# Patient Record
Sex: MALE | Race: BLACK OR AFRICAN AMERICAN | Employment: OTHER | URBAN - METROPOLITAN AREA
[De-identification: names, ages, dates, MRNs, and addresses within clinical notes are randomized per-mention and may not be internally consistent; named-entity substitution may affect disease eponyms.]

---

## 2020-01-20 ENCOUNTER — HOSPITAL ENCOUNTER (OUTPATIENT)
Dept: SURGERY | Age: 75
Discharge: HOME | End: 2020-01-22
Payer: MEDICARE

## 2020-01-20 PROCEDURE — 6370000000 HC RX 637 (ALT 250 FOR IP): Performed by: OPHTHALMOLOGY

## 2020-01-20 PROCEDURE — 66821 AFTER CATARACT LASER SURGERY: CPT

## 2020-01-20 RX ORDER — TROPICAMIDE 10 MG/ML
1 SOLUTION/ DROPS OPHTHALMIC SEE ADMIN INSTRUCTIONS
Status: COMPLETED | OUTPATIENT
Start: 2020-01-20 | End: 2020-01-20

## 2020-01-20 RX ORDER — PHENYLEPHRINE HYDROCHLORIDE 100 MG/ML
1 SOLUTION/ DROPS OPHTHALMIC SEE ADMIN INSTRUCTIONS
Status: COMPLETED | OUTPATIENT
Start: 2020-01-20 | End: 2020-01-20

## 2020-01-20 RX ADMIN — PHENYLEPHRINE HYDROCHLORIDE 1 DROP: 100 SOLUTION/ DROPS OPHTHALMIC at 09:40

## 2020-01-20 RX ADMIN — TROPICAMIDE 1 DROP: 10 SOLUTION/ DROPS OPHTHALMIC at 09:35

## 2020-01-20 RX ADMIN — APRACLONIDINE HYDROCHLORIDE 1 DROP: 10 SOLUTION/ DROPS OPHTHALMIC at 10:41

## 2020-01-20 RX ADMIN — TROPICAMIDE 1 DROP: 10 SOLUTION/ DROPS OPHTHALMIC at 09:40

## 2020-01-20 RX ADMIN — PHENYLEPHRINE HYDROCHLORIDE 1 DROP: 100 SOLUTION/ DROPS OPHTHALMIC at 09:35

## 2020-01-20 NOTE — PROGRESS NOTES
were reviewed with patient/significant other. A copy was given to the patient/significant other. The following additional patient specific information was reviewed with the patient/significant other:  []Procedure/physician specific instructions  []Other -         Nurse Javier Bullock R.N. Date/time 1/20/2020 1:38 PM         SAME DAY SERVICES:  [phone] (Monday-Friday 6 a.m - 6 p.m.)        If you smoke STOP. We care about your health!

## 2020-01-20 NOTE — OP NOTE
[facility]    OPERATIVE NOTE    Preoperative Diagnosis: Posterior Capsular Opacification, Right Eye    Postoperative Diagnosis: Posterior Capsular Opacification, Right Eye    Procedure: YAG posterior capsulotomy, Right Eye    Surgeon: Sonia Fish    Anesthesia: Topical    Complications: none      Indications for procedure: The patient is a 69y.o. year old who is status post cataract extraction with intraocular lens implantation. Visually significant posterior capsular opacification was noted on examination. Risks, benefits, and alternatives to surgery were discussed with the patient and the patient elected to proceed with YAG laser posterior capsulotomy of the right eye. Details of the procedure: The patient was brought to the laser room after dilation of the right eye was done with 10% neosynephrine and 1% mydriacyl every 5 minutes for a total of two sets. The patient was positioned at the YAG laser where 38 shots were administered at 1.8 millijoules of power. The capsule opened nicely. The patient tolerated the procedure well and was asked to remain for a post-operative pressure check.       Sonia Fish M.D.

## 2022-04-27 LAB
BACTERIA: ABNORMAL /HPF
BILIRUBIN URINE: NEGATIVE
BLOOD, URINE: ABNORMAL
CLARITY: CLEAR
COLOR: YELLOW
EPITHELIAL CELLS, UA: 2 /HPF (ref 0–5)
GLUCOSE URINE: 500 MG/DL
HYALINE CASTS: 16 /LPF (ref 0–8)
HYALINE CASTS: PRESENT
KETONES, URINE: NEGATIVE MG/DL
LEUKOCYTE ESTERASE, URINE: NEGATIVE
MICROSCOPIC EXAMINATION: YES
NITRITE, URINE: NEGATIVE
PH UA: 7 (ref 5–8)
PROTEIN UA: >=1000 MG/DL
RBC UA: 8 /HPF (ref 0–4)
SPECIFIC GRAVITY UA: 1.03 (ref 1–1.03)
URINE TYPE: ABNORMAL
UROBILINOGEN, URINE: 1 E.U./DL
WBC UA: 2 /HPF (ref 0–5)

## 2025-03-17 ENCOUNTER — APPOINTMENT (OUTPATIENT)
Dept: GENERAL RADIOLOGY | Age: 80
End: 2025-03-17
Payer: MEDICARE

## 2025-03-17 ENCOUNTER — APPOINTMENT (OUTPATIENT)
Dept: ULTRASOUND IMAGING | Age: 80
End: 2025-03-17
Payer: MEDICARE

## 2025-03-17 ENCOUNTER — HOSPITAL ENCOUNTER (INPATIENT)
Age: 80
LOS: 5 days | Discharge: HOME | End: 2025-03-22
Attending: STUDENT IN AN ORGANIZED HEALTH CARE EDUCATION/TRAINING PROGRAM | Admitting: STUDENT IN AN ORGANIZED HEALTH CARE EDUCATION/TRAINING PROGRAM
Payer: MEDICARE

## 2025-03-17 DIAGNOSIS — Z99.2 ESRD NEEDING DIALYSIS (HCC): Primary | ICD-10-CM

## 2025-03-17 DIAGNOSIS — J81.0 ACUTE PULMONARY EDEMA (HCC): ICD-10-CM

## 2025-03-17 DIAGNOSIS — R74.8 ELEVATED LIVER ENZYMES: ICD-10-CM

## 2025-03-17 DIAGNOSIS — E87.5 HYPERKALEMIA: ICD-10-CM

## 2025-03-17 DIAGNOSIS — J96.01 ACUTE RESPIRATORY FAILURE WITH HYPOXIA: ICD-10-CM

## 2025-03-17 DIAGNOSIS — N18.6 ESRD NEEDING DIALYSIS (HCC): Primary | ICD-10-CM

## 2025-03-17 LAB
ACANTHOCYTES BLD QL SMEAR: ABNORMAL
ALBUMIN SERPL-MCNC: 3.9 G/DL (ref 3.4–5)
ALBUMIN/GLOB SERPL: 1.3 {RATIO} (ref 1.1–2.2)
ALP SERPL-CCNC: 89 U/L (ref 40–129)
ALT SERPL-CCNC: 886 U/L (ref 10–40)
ANION GAP SERPL CALCULATED.3IONS-SCNC: 32 MMOL/L (ref 3–16)
ANISOCYTOSIS BLD QL SMEAR: ABNORMAL
AST SERPL-CCNC: 767 U/L (ref 15–37)
BASE EXCESS BLDV CALC-SCNC: -12.3 MMOL/L
BASOPHILS # BLD: 0 K/UL (ref 0–0.2)
BASOPHILS NFR BLD: 0 %
BILIRUB SERPL-MCNC: 1.3 MG/DL (ref 0–1)
BUN SERPL-MCNC: 133 MG/DL (ref 7–20)
CALCIUM SERPL-MCNC: 10.1 MG/DL (ref 8.3–10.6)
CHLORIDE SERPL-SCNC: 100 MMOL/L (ref 99–110)
CO2 BLDV-SCNC: 16 MMOL/L
CO2 SERPL-SCNC: 13 MMOL/L (ref 21–32)
COHGB MFR BLDV: 1.4 %
CREAT SERPL-MCNC: 13.7 MG/DL (ref 0.8–1.3)
DEPRECATED RDW RBC AUTO: 20.6 % (ref 12.4–15.4)
EKG ATRIAL RATE: 65 BPM
EKG ATRIAL RATE: 69 BPM
EKG DIAGNOSIS: NORMAL
EKG DIAGNOSIS: NORMAL
EKG P AXIS: 57 DEGREES
EKG P AXIS: 63 DEGREES
EKG P-R INTERVAL: 164 MS
EKG P-R INTERVAL: 184 MS
EKG Q-T INTERVAL: 424 MS
EKG Q-T INTERVAL: 428 MS
EKG QRS DURATION: 104 MS
EKG QRS DURATION: 94 MS
EKG QTC CALCULATION (BAZETT): 445 MS
EKG QTC CALCULATION (BAZETT): 454 MS
EKG R AXIS: 18 DEGREES
EKG R AXIS: 27 DEGREES
EKG T AXIS: 70 DEGREES
EKG T AXIS: 70 DEGREES
EKG VENTRICULAR RATE: 65 BPM
EKG VENTRICULAR RATE: 69 BPM
EOSINOPHIL # BLD: 0 K/UL (ref 0–0.6)
EOSINOPHIL NFR BLD: 0 %
FERRITIN SERPL IA-MCNC: 5230 NG/ML (ref 30–400)
FLUAV + FLUBV AG NOSE IA.RAPID: NOT DETECTED
FLUAV + FLUBV AG NOSE IA.RAPID: NOT DETECTED
GFR SERPLBLD CREATININE-BSD FMLA CKD-EPI: 3 ML/MIN/{1.73_M2}
GLUCOSE BLD-MCNC: 116 MG/DL (ref 70–99)
GLUCOSE BLD-MCNC: 272 MG/DL (ref 70–99)
GLUCOSE SERPL-MCNC: 198 MG/DL (ref 70–99)
HAV IGM SERPL QL IA: NORMAL
HBV CORE IGM SERPL QL IA: NORMAL
HBV SURFACE AB SERPL IA-ACNC: <3.5 MIU/ML
HBV SURFACE AG SERPL QL IA: NORMAL
HCO3 BLDV-SCNC: 15 MMOL/L (ref 23–29)
HCT VFR BLD AUTO: 36.3 % (ref 40.5–52.5)
HCV AB SERPL QL IA: NORMAL
HGB BLD-MCNC: 11.4 G/DL (ref 13.5–17.5)
HYPOCHROMIA BLD QL SMEAR: ABNORMAL
IRON SATN MFR SERPL: 91 % (ref 20–50)
IRON SERPL-MCNC: 155 UG/DL (ref 59–158)
LACTATE BLDV-SCNC: 2.3 MMOL/L (ref 0.4–2)
LACTATE BLDV-SCNC: 3.9 MMOL/L (ref 0.4–1.9)
LACTATE BLDV-SCNC: 5.6 MMOL/L (ref 0.4–1.9)
LYMPHOCYTES # BLD: 0.9 K/UL (ref 1–5.1)
LYMPHOCYTES NFR BLD: 7 %
MACROCYTES BLD QL SMEAR: ABNORMAL
MCH RBC QN AUTO: 30.1 PG (ref 26–34)
MCHC RBC AUTO-ENTMCNC: 31.5 G/DL (ref 31–36)
MCV RBC AUTO: 95.6 FL (ref 80–100)
METHGB MFR BLDV: 0.9 %
MONOCYTES # BLD: 1 K/UL (ref 0–1.3)
MONOCYTES NFR BLD: 8 %
NEUTROPHILS # BLD: 10.5 K/UL (ref 1.7–7.7)
NEUTROPHILS NFR BLD: 85 %
NRBC BLD-RTO: 4 /100 WBC
O2 THERAPY: ABNORMAL
OVALOCYTES BLD QL SMEAR: ABNORMAL
PCO2 BLDV: 36.5 MMHG (ref 40–50)
PERFORMED ON: ABNORMAL
PERFORMED ON: ABNORMAL
PH BLDV: 7.21 [PH] (ref 7.35–7.45)
PLATELET # BLD AUTO: 158 K/UL (ref 135–450)
PLATELET BLD QL SMEAR: ADEQUATE
PMV BLD AUTO: 10.1 FL (ref 5–10.5)
PO2 BLDV: 46 MMHG
POIKILOCYTOSIS BLD QL SMEAR: ABNORMAL
POLYCHROMASIA BLD QL SMEAR: ABNORMAL
POTASSIUM SERPL-SCNC: 6.7 MMOL/L (ref 3.5–5.1)
PROT SERPL-MCNC: 6.8 G/DL (ref 6.4–8.2)
RBC # BLD AUTO: 3.79 M/UL (ref 4.2–5.9)
SAO2 % BLDV: 63 %
SARS-COV-2 RDRP RESP QL NAA+PROBE: NOT DETECTED
SCHISTOCYTES BLD QL SMEAR: ABNORMAL
SLIDE REVIEW: ABNORMAL
SODIUM SERPL-SCNC: 145 MMOL/L (ref 136–145)
TARGETS BLD QL SMEAR: ABNORMAL
TIBC SERPL-MCNC: 171 UG/DL (ref 260–445)
WBC # BLD AUTO: 12.4 K/UL (ref 4–11)

## 2025-03-17 PROCEDURE — 82803 BLOOD GASES ANY COMBINATION: CPT

## 2025-03-17 PROCEDURE — 6370000000 HC RX 637 (ALT 250 FOR IP): Performed by: STUDENT IN AN ORGANIZED HEALTH CARE EDUCATION/TRAINING PROGRAM

## 2025-03-17 PROCEDURE — 99285 EMERGENCY DEPT VISIT HI MDM: CPT

## 2025-03-17 PROCEDURE — 87635 SARS-COV-2 COVID-19 AMP PRB: CPT

## 2025-03-17 PROCEDURE — 76705 ECHO EXAM OF ABDOMEN: CPT

## 2025-03-17 PROCEDURE — 2580000003 HC RX 258: Performed by: STUDENT IN AN ORGANIZED HEALTH CARE EDUCATION/TRAINING PROGRAM

## 2025-03-17 PROCEDURE — 82728 ASSAY OF FERRITIN: CPT

## 2025-03-17 PROCEDURE — 6360000002 HC RX W HCPCS: Performed by: STUDENT IN AN ORGANIZED HEALTH CARE EDUCATION/TRAINING PROGRAM

## 2025-03-17 PROCEDURE — 93005 ELECTROCARDIOGRAM TRACING: CPT | Performed by: STUDENT IN AN ORGANIZED HEALTH CARE EDUCATION/TRAINING PROGRAM

## 2025-03-17 PROCEDURE — 80074 ACUTE HEPATITIS PANEL: CPT

## 2025-03-17 PROCEDURE — 85025 COMPLETE CBC W/AUTO DIFF WBC: CPT

## 2025-03-17 PROCEDURE — 36556 INSERT NON-TUNNEL CV CATH: CPT | Performed by: NURSE PRACTITIONER

## 2025-03-17 PROCEDURE — 99222 1ST HOSP IP/OBS MODERATE 55: CPT | Performed by: SURGERY

## 2025-03-17 PROCEDURE — 83550 IRON BINDING TEST: CPT

## 2025-03-17 PROCEDURE — 87040 BLOOD CULTURE FOR BACTERIA: CPT

## 2025-03-17 PROCEDURE — 71045 X-RAY EXAM CHEST 1 VIEW: CPT

## 2025-03-17 PROCEDURE — 2000000000 HC ICU R&B

## 2025-03-17 PROCEDURE — 83540 ASSAY OF IRON: CPT

## 2025-03-17 PROCEDURE — 5A1D70Z PERFORMANCE OF URINARY FILTRATION, INTERMITTENT, LESS THAN 6 HOURS PER DAY: ICD-10-PCS | Performed by: FAMILY MEDICINE

## 2025-03-17 PROCEDURE — 90935 HEMODIALYSIS ONE EVALUATION: CPT

## 2025-03-17 PROCEDURE — 86706 HEP B SURFACE ANTIBODY: CPT

## 2025-03-17 PROCEDURE — 2500000003 HC RX 250 WO HCPCS: Performed by: STUDENT IN AN ORGANIZED HEALTH CARE EDUCATION/TRAINING PROGRAM

## 2025-03-17 PROCEDURE — 87502 INFLUENZA DNA AMP PROBE: CPT

## 2025-03-17 PROCEDURE — 36415 COLL VENOUS BLD VENIPUNCTURE: CPT

## 2025-03-17 PROCEDURE — 06HM33Z INSERTION OF INFUSION DEVICE INTO RIGHT FEMORAL VEIN, PERCUTANEOUS APPROACH: ICD-10-PCS | Performed by: FAMILY MEDICINE

## 2025-03-17 PROCEDURE — 93005 ELECTROCARDIOGRAM TRACING: CPT | Performed by: PHYSICIAN ASSISTANT

## 2025-03-17 PROCEDURE — 83605 ASSAY OF LACTIC ACID: CPT

## 2025-03-17 PROCEDURE — 93010 ELECTROCARDIOGRAM REPORT: CPT | Performed by: INTERNAL MEDICINE

## 2025-03-17 PROCEDURE — 80053 COMPREHEN METABOLIC PANEL: CPT

## 2025-03-17 RX ORDER — SODIUM CHLORIDE 0.9 % (FLUSH) 0.9 %
5-40 SYRINGE (ML) INJECTION PRN
Status: DISCONTINUED | OUTPATIENT
Start: 2025-03-17 | End: 2025-03-22 | Stop reason: HOSPADM

## 2025-03-17 RX ORDER — DIPYRIDAMOLE 75 MG
75 TABLET ORAL
Status: DISCONTINUED | OUTPATIENT
Start: 2025-03-17 | End: 2025-03-17

## 2025-03-17 RX ORDER — SPIRONOLACTONE 25 MG/1
50 TABLET ORAL DAILY
Status: DISCONTINUED | OUTPATIENT
Start: 2025-03-17 | End: 2025-03-22 | Stop reason: HOSPADM

## 2025-03-17 RX ORDER — SEVELAMER CARBONATE 800 MG/1
1 TABLET, FILM COATED ORAL
COMMUNITY

## 2025-03-17 RX ORDER — SODIUM CHLORIDE 0.9 % (FLUSH) 0.9 %
5-40 SYRINGE (ML) INJECTION EVERY 12 HOURS SCHEDULED
Status: DISCONTINUED | OUTPATIENT
Start: 2025-03-17 | End: 2025-03-22 | Stop reason: HOSPADM

## 2025-03-17 RX ORDER — ASPIRIN 81 MG/1
81 TABLET ORAL DAILY
COMMUNITY

## 2025-03-17 RX ORDER — DORZOLAMIDE HYDROCHLORIDE AND TIMOLOL MALEATE 20; 5 MG/ML; MG/ML
1 SOLUTION/ DROPS OPHTHALMIC 2 TIMES DAILY
Status: DISCONTINUED | OUTPATIENT
Start: 2025-03-17 | End: 2025-03-22 | Stop reason: HOSPADM

## 2025-03-17 RX ORDER — SEVELAMER CARBONATE 800 MG/1
800 TABLET, FILM COATED ORAL
Status: DISCONTINUED | OUTPATIENT
Start: 2025-03-17 | End: 2025-03-22 | Stop reason: HOSPADM

## 2025-03-17 RX ORDER — CALCIUM GLUCONATE 20 MG/ML
1000 INJECTION, SOLUTION INTRAVENOUS ONCE
Status: COMPLETED | OUTPATIENT
Start: 2025-03-17 | End: 2025-03-17

## 2025-03-17 RX ORDER — ONDANSETRON 2 MG/ML
4 INJECTION INTRAMUSCULAR; INTRAVENOUS EVERY 6 HOURS PRN
Status: DISCONTINUED | OUTPATIENT
Start: 2025-03-17 | End: 2025-03-22 | Stop reason: HOSPADM

## 2025-03-17 RX ORDER — SODIUM CHLORIDE 9 MG/ML
INJECTION, SOLUTION INTRAVENOUS PRN
Status: DISCONTINUED | OUTPATIENT
Start: 2025-03-17 | End: 2025-03-22 | Stop reason: HOSPADM

## 2025-03-17 RX ORDER — HYDRALAZINE HYDROCHLORIDE 20 MG/ML
10 INJECTION INTRAMUSCULAR; INTRAVENOUS EVERY 6 HOURS PRN
Status: DISCONTINUED | OUTPATIENT
Start: 2025-03-17 | End: 2025-03-22 | Stop reason: HOSPADM

## 2025-03-17 RX ORDER — SPIRONOLACTONE 50 MG/1
50 TABLET, FILM COATED ORAL DAILY
COMMUNITY

## 2025-03-17 RX ORDER — INDOMETHACIN 25 MG/1
50 CAPSULE ORAL ONCE
Status: COMPLETED | OUTPATIENT
Start: 2025-03-17 | End: 2025-03-17

## 2025-03-17 RX ORDER — GLUCAGON 1 MG/ML
1 KIT INJECTION PRN
Status: DISCONTINUED | OUTPATIENT
Start: 2025-03-17 | End: 2025-03-22 | Stop reason: HOSPADM

## 2025-03-17 RX ORDER — LOSARTAN POTASSIUM 50 MG/1
50 TABLET ORAL DAILY
Status: DISCONTINUED | OUTPATIENT
Start: 2025-03-17 | End: 2025-03-22 | Stop reason: HOSPADM

## 2025-03-17 RX ORDER — ASPIRIN 81 MG/1
81 TABLET, CHEWABLE ORAL DAILY
Status: DISCONTINUED | OUTPATIENT
Start: 2025-03-17 | End: 2025-03-22 | Stop reason: HOSPADM

## 2025-03-17 RX ORDER — EZETIMIBE AND SIMVASTATIN 10; 80 MG/1; MG/1
1 TABLET ORAL NIGHTLY
COMMUNITY

## 2025-03-17 RX ORDER — ACETAMINOPHEN 325 MG/1
650 TABLET ORAL EVERY 6 HOURS PRN
Status: DISCONTINUED | OUTPATIENT
Start: 2025-03-17 | End: 2025-03-22 | Stop reason: HOSPADM

## 2025-03-17 RX ORDER — NIFEDIPINE 60 MG/1
60 TABLET, EXTENDED RELEASE ORAL 2 TIMES DAILY
Status: DISCONTINUED | OUTPATIENT
Start: 2025-03-17 | End: 2025-03-17

## 2025-03-17 RX ORDER — POLYETHYLENE GLYCOL 3350 17 G/17G
17 POWDER, FOR SOLUTION ORAL DAILY PRN
Status: DISCONTINUED | OUTPATIENT
Start: 2025-03-17 | End: 2025-03-22 | Stop reason: HOSPADM

## 2025-03-17 RX ORDER — HEPARIN SODIUM 1000 [USP'U]/ML
2800 INJECTION, SOLUTION INTRAVENOUS; SUBCUTANEOUS PRN
Status: DISCONTINUED | OUTPATIENT
Start: 2025-03-17 | End: 2025-03-22 | Stop reason: HOSPADM

## 2025-03-17 RX ORDER — NEPHROCAP 1 MG
1 CAP ORAL DAILY
COMMUNITY

## 2025-03-17 RX ORDER — DEXTROSE MONOHYDRATE 100 MG/ML
INJECTION, SOLUTION INTRAVENOUS CONTINUOUS PRN
Status: DISCONTINUED | OUTPATIENT
Start: 2025-03-17 | End: 2025-03-22 | Stop reason: HOSPADM

## 2025-03-17 RX ORDER — NIFEDIPINE 60 MG/1
60 TABLET, EXTENDED RELEASE ORAL 2 TIMES DAILY
COMMUNITY
End: 2025-03-17

## 2025-03-17 RX ORDER — HEPARIN SODIUM 5000 [USP'U]/ML
5000 INJECTION, SOLUTION INTRAVENOUS; SUBCUTANEOUS EVERY 8 HOURS SCHEDULED
Status: DISCONTINUED | OUTPATIENT
Start: 2025-03-17 | End: 2025-03-17

## 2025-03-17 RX ORDER — ACETAMINOPHEN 650 MG/1
650 SUPPOSITORY RECTAL EVERY 6 HOURS PRN
Status: DISCONTINUED | OUTPATIENT
Start: 2025-03-17 | End: 2025-03-22 | Stop reason: HOSPADM

## 2025-03-17 RX ORDER — ONDANSETRON 4 MG/1
4 TABLET, ORALLY DISINTEGRATING ORAL EVERY 8 HOURS PRN
Status: DISCONTINUED | OUTPATIENT
Start: 2025-03-17 | End: 2025-03-22 | Stop reason: HOSPADM

## 2025-03-17 RX ORDER — TORSEMIDE 100 MG/1
100 TABLET ORAL DAILY
COMMUNITY

## 2025-03-17 RX ADMIN — DORZOLAMIDE HYDROCHLORIDE AND TIMOLOL MALEATE 1 DROP: 20; 5 SOLUTION/ DROPS OPHTHALMIC at 19:46

## 2025-03-17 RX ADMIN — SODIUM BICARBONATE 50 MEQ: 84 INJECTION INTRAVENOUS at 12:35

## 2025-03-17 RX ADMIN — CALCIUM GLUCONATE 1000 MG: 20 INJECTION, SOLUTION INTRAVENOUS at 12:03

## 2025-03-17 RX ADMIN — SODIUM ZIRCONIUM CYCLOSILICATE 10 G: 10 POWDER, FOR SUSPENSION ORAL at 12:35

## 2025-03-17 RX ADMIN — HYDRALAZINE HYDROCHLORIDE 10 MG: 20 INJECTION INTRAMUSCULAR; INTRAVENOUS at 19:22

## 2025-03-17 RX ADMIN — DEXTROSE 250 ML: 10 SOLUTION INTRAVENOUS at 12:18

## 2025-03-17 RX ADMIN — HUMAN INSULIN 10 UNITS: 100 INJECTION, SOLUTION SUBCUTANEOUS at 12:35

## 2025-03-17 NOTE — PROCEDURES
Central Venous Catheter Insertion Procedure Note    Consent:   Informed consent was obtained for the procedure, including sedation.  Risks of  hemorrhage, arrhythmia, infection and adverse drug reaction were discussed    Indication: ESRD, need for HD    A Time Out was performed to identify the correct patient and the correct procedure.     Procedure:    Ultrasound used and rightfemoral vein was noted to be patent.    Patient positioned then prepared with sterile technique. Including placement of sterile drape, wearing of sterile gloves/gown and adhering to universal precautions.  Lidocaine was administered via 25 gauge needle.  18 gauge needle inserted with access of rightfemoral vein under active ultrasound guidance.  Wire inserted to 20 cm (image saved).  Small incision made in skin, dilator inserted over wire then removed.  Triple lumen inserted to 20 cm, secured and sterile dressing applied including placement of biopatch.    Complications:  None  There were no changes to vital signs. Patient did tolerate procedure well.    Estimated blood loss:  <10 ml    No immediate complications    [facility]

## 2025-03-17 NOTE — CONSULTS
[facility]          [address] [place] [address]                              CONSULTATION      PATIENT NAME: PARKER SNOW             : 1945  MED REC NO: [id number]                      ROOM: 010  ACCOUNT NO: [id number]                       ADMIT DATE: 2025  PROVIDER: Purvi Faust MD      CONSULT DATE: 2025    REASON FOR CONSULTATION:  Emergent dialysis.    HISTORY OF PRESENTING ILLNESS:  A 79-year-old  male with past medical history significant for hypertension; diabetes mellitus type 2; PLA2R membranous nephropathy; end-stage renal disease, on hemodialysis Tuesday and Saturday, came to ER complaining of shortness of breath and dyspnea on exertion.  The patient went to his dialysis unit on Saturday, but could not get the treatment because of clotted AV fistula.  He came to ER complaining of shortness of breath and dyspnea on exertion.  Admitted for further care and management.  Routine labs showed potassium was 6.7, bicarb of 13, , creatinine 13.7, and lactic acid of 5.6.  Case discussed with the emergency room team for emergent treatment of hyperkalemia and severe metabolic acidosis.  Arrangements being made for the patient to be admitted in ICU in need of temporary dialysis catheter and emergent dialysis treatment.    Initial plan was to have Interventional Radiology place a tunneled catheter, but plan changed because of severe hyperkalemia.    At the time of consultation, the patient denies any chest pain, feeling weak, tired, or decreased level of energy; complaining of shortness of breath and dyspnea on exertion.  Denies any weakness or dizziness.    PAST MEDICAL HISTORY:    1. Longstanding history of diabetes mellitus type 2.  2. End-stage renal disease, on hemodialysis twice a week (kidney biopsy-proven membranous PLA2R membranous nephropathy along with diabetes and hypertensive nephrosclerosis).  3.

## 2025-03-17 NOTE — ED NOTES
This rn obtained first set of cultures from pt rt fa, josé manuel ricks obtained second set from pt rt hand.

## 2025-03-17 NOTE — ED PROVIDER NOTES
EMERGENCY DEPARTMENT ENCOUNTER        Pt Name: Elroy Tyson  MRN: [id number]  Birthdate 1945  Date of evaluation: 3/17/2025  Provider: Meghan Newberry PA-C  PCP: Kellee Altamirano MD  Note Started: 8:54 AM EDT 3/17/25       I have seen and evaluated this patient with my supervising physician Dr. Garg      CHIEF COMPLAINT       Chief Complaint   Patient presents with    Fatigue     Pt arrives with [place] [facility] from home, c/o fatigue, pt missed dialysis on sat. D/t fatigue, he also reports shortness of breath, spo2 88% ra. Pt does not usually wear o2. On 2 L nc spo2  98%. States he started feeling sob/fatigue x7 days ago    Shortness of Breath       HISTORY OF PRESENT ILLNESS: 1 or more Elements     History From: patient    Elroy Tyson is a 79 y.o. male who presents for fatigue for 6 days along with missed dialysis.  Normally has dialysis Tu/Th/Sa.  Missed dialysis Saturday and sounds like he probably missed Thursday.  Thinks last dialysis was Tuesday.  Chief complaint note does state SOB but pt denies SOB to me.  Denies cough, congestion, rhinorrhea, chest pain, nausea, vomiting, abdominal pain. Nephrologist is Dr. Oneal.  Goes to [facility] in [facility].    Nursing Notes were reviewed and agreed with or any disagreements were addressed in the HPI.    REVIEW OF SYSTEMS :      Review of Systems    Positives and Pertinent negatives as per HPI.     SURGICAL HISTORY     Past Surgical History:   Procedure Laterality Date    COLONOSCOPY  October 24, 2012    Dr. Dat Brewster    CORONARY ARTERY BYPASS GRAFT  1987    [facility] - single bypass (LIM[facility])       CURRENTMEDICATIONS       Current Discharge Medication List        CONTINUE these medications which have NOT CHANGED    Details   ezetimibe-simvastatin (VYTORIN) 10-80 MG per tablet Take 1 tablet by mouth nightly      sevelamer (RENVELA) 800 MG tablet Take 1 tablet by mouth 3 times daily (with meals)       apixaban (ELIQUIS) 2.5 MG TABS tablet Take 1 tablet by mouth 2 times daily      spironolactone (ALDACTONE) 50 MG tablet Take 1 tablet by mouth daily      torsemide (DEMADEX) 100 MG tablet Take 1 tablet by mouth daily      B Complex-C-Folic Acid (VIRT-CAPS) 1 MG CAPS Take 1 capsule by mouth daily      aspirin 81 MG EC tablet Take 1 tablet by mouth daily      irbesartan (AVAPRO) 300 MG tablet TAKE ONE TABLET BY MOUTH DAILY  Qty: 90 tablet, Refills: 3      dorzolamide-timolol (COSOPT) 22.3-6.8 MG/ML ophthalmic solution Place 1 drop into both eyes 2 times daily             ALLERGIES     Benicar [olmesartan medoxomil], Imdur [isosorbide mononitrate], and Nitroglycerin    FAMILYHISTORY       Family History   Problem Relation Age of Onset    Cancer Mother         cervical cancer (?)    Substance Abuse Father         smoker    COPD Father     Emphysema Father     Cancer Sister     Stroke Brother 60    Lupus Sister     COPD Brother     Substance Abuse Brother         smoker    Emphysema Brother         SOCIAL HISTORY       Social History     Tobacco Use    Smoking status: Former     Types: Cigarettes    Smokeless tobacco: Never    Tobacco comments:     quit smoking in 1987 (age 41) when he had a heart attack   Substance Use Topics    Alcohol use: Yes     Comment: social    Drug use: No       SCREENINGS        [place] Coma Scale  Eye Opening: Spontaneous  Best Verbal Response: Oriented  Best Motor Response: Obeys commands  Moni Coma Scale Score: 15                CIWA Assessment  BP: (!) 171/93  Pulse: 70           PHYSICAL EXAM  1 or more Elements     ED Triage Vitals   BP Systolic BP Percentile Diastolic BP Percentile Temp Temp Source Pulse Respirations SpO2   03/17/25 0845 -- -- 03/17/25 0845 03/17/25 0845 03/17/25 0845 03/17/25 0845 03/17/25 0840   (!) 163/98   97.8 °F (36.6 °C) Oral 71 18 (!) 88 %      Height Weight         -- --                       Physical Exam  Constitutional:       General: He is not in acute

## 2025-03-17 NOTE — PROGRESS NOTES
4 Eyes Skin Assessment     The patient is being assess for  Admission    I agree that 2 RN's have performed a thorough Head to Toe Skin Assessment on the patient. ALL assssment sites listed below have been assessed.       Areas assessed by both nurses: [place]  [x]   Head, Face, and Ears   [x]   Shoulders, Back, and Chest  [x]   Arms, Elbows, and Hands   [x]   Coccyx, Sacrum, and IschIum  [x]   Legs, Feet, and Heels        Does the Patient have Skin Breakdown?  Yes LDA WOUND CARE was Initiated documentation include the Prema-wound, Wound Assessment, Measurements, Dressing Treatment, Drainage, and Color\",         Laci Prevention initiated:  Yes   Wound Care Orders initiated:  No      WOC nurse consulted for Pressure Injury (Stage 3,4, Unstageable, DTI, NWPT, and Complex wounds), New and Established Ostomies:  No      Nurse 1 eSignature: Electronically signed by Viviana Stallings RN on 3/17/25 at 5:48 PM EDT    **SHARE this note so that the co-signing nurse is able to place an eSignature**    Nurse 2 eSignature: Electronically signed by Jesus Calero RN on 3/17/25 at 7:40 PM EDT

## 2025-03-17 NOTE — CONSULTS
[facility]  Consultation Note    3/17/2025 1:34 PM    Chief Complaint: Fatigue     Reason for Consult: Fistula Evaluation    History of Present Illness:  Patient is a 79 y.o. male who presented to the ED on 3/17/2025 with complaints of Fatigue and Shortness of Breath. He has a significant PMH of ESED, HLD, HTN, MI, and CABG (1987). The patient presented to the ED today d/t shortness of breath and fatigue after missing HD on Saturday d/t his AVG being clotted. He dialyzes 2x per week on Tuesday and Saturday typically. The patient had his Left Brachial-Brachial AVG placed by Dr. Pickard in March 2023. The patient's wife tells me he has not taken any of his medications since Tuesday or Wednesday week including his Eliquis. She believes he is taking the Eliquis d/t clots in his AVG. The patient is overall unable to provide any significant information regarding his medical history. We are consulted to evaluate the patient's LUE AVG. At present, he is seen resting in bed in ICU and appears to be hemodynamically stable.    Review of Systems  Review of Systems   Unable to perform ROS: Mental status change        Past Medical History:   Diagnosis Date    Acromioclavicular joint arthritis 2/10/2012    Allergic rhinitis 5/10/2013    Ankle fracture 3/3/2014    Left ankle - February 2014    Back Pain     Cardiac Dysrhythmias     Cardiomegaly     Chronic kidney disease, stage III (moderate) (HCC)     Colon polyps 1/11/2013    Coronary artery disease     Diabetic retinopathy (HCC)     Diverticulosis 1/11/2013    Glaucoma     Glaucoma     HYPERLIPIDEMIA     Hypernatremia 5/26/2012    Hypertension     Lesion of plantar nerve     MI     Right rotator cuff tear 10/28/2011    Vitamin D deficiency 10/8/2010       Past Surgical History:   Procedure Laterality Date    COLONOSCOPY  October 24, 2012    Dr. Dat Brewster    CORONARY ARTERY BYPASS GRAFT  1987    [facility] -

## 2025-03-17 NOTE — ED PROVIDER NOTES
ED Attending Staffing Note  Patient was seen with the DEISY Newberry. I was present for the significant portions of the H&P as well as performance and interpretation of procedures and EKGs.     I have personally performed a face to face evaluation on this patient. I have reviewed and agree with history and physical examination patient management and disposition.    CHIEF COMPLAINT    Fatigue (Pt arrives with [place] [facility] from home, c/o fatigue, pt missed dialysis on sat. D/t fatigue, he also reports shortness of breath, spo2 88% ra. Pt does not usually wear o2. On 2 L nc spo2  98%. States he started feeling sob/fatigue x7 days ago) and Shortness of Breath      PAST MEDICAL HISTORY    Past Medical History:   Diagnosis Date    Acromioclavicular joint arthritis 2/10/2012    Allergic rhinitis 5/10/2013    Ankle fracture 3/3/2014    Left ankle - February 2014    Back Pain     Cardiac Dysrhythmias     Cardiomegaly     Chronic kidney disease, stage III (moderate) (HCC)     Colon polyps 1/11/2013    Coronary artery disease     Diabetic retinopathy (HCC)     Diverticulosis 1/11/2013    Glaucoma     Glaucoma     HYPERLIPIDEMIA     Hypernatremia 5/26/2012    Hypertension     Lesion of plantar nerve     MI     Right rotator cuff tear 10/28/2011    Vitamin D deficiency 10/8/2010       Vitals  BP (!) 163/98   Pulse 71   Temp 97.8 °F (36.6 °C) (Oral)   Resp 18   SpO2 97%     HPI:  Elroy Tyson is a 79 y.o. male with history of ESRD on HD who presents with fatigue and missed HD. Briefly the salient points of the case are as follows:  Patient is a difficulty in breathing fatigue and missed dialysis will be the last course of dialysis was on Tuesday  He was brought in via 9 1/1/2010 to be somewhat hypoxic here  Reports nonspecific symptoms    MDM:  No acute distress, vital signs here are notable for hypoxia is on currently nasal cannula  This is a chronically ill-appearing 79-year-old male here today with missed dialysis and  nonspecific symptoms.  Certainly if concerns for hyperkalemia, EKG did not show any acute evidence of this however.  Patient will likely require admission here for hemodialysis as well as further supportive care measures.  EKG interpretation was seen below.  Patient's EKG shows normal sinus rhythm rate 65, there are some sinus arrhythmia, otherwise there is no peaking of T waves there is no loss of P waves and there is no widening of QRS complexes at this time.    Total critical care time provided today thus far was 40 minutes. This excludes seperately billable procedures. Critical care time was provided for hyperkalemia, renal failure that required close evaluation and/or intervention with concern for potential patient decompensation.         ED Course as of 03/17/25 1203   Mon Mar 17, 2025   1114 Laboratory studies notable for potassium 6.7, , as well as LFT elevation as well as an elevated lactate.  Nephrology came to the bedside at this time they would like a vas catheter placed which should be done by the ICU they recommended an amp of bicarb as well as a hyperkalemia cocktail including insulin Lokelma and calcium. [BT]   1114 Is unclear the etiology of the LFT elevations, possible shock liver but patient's not been hypotensive at all. [BT]      ED Course User Index  [BT] Rosa Elena Garg MD Tadayon, Bobbak C, MD  03/17/25 1203

## 2025-03-17 NOTE — PROGRESS NOTES
During shift change/assessment; pt was very anxious; ,moving around in bed; breathing heavy momentarily and then becomes lethargic and appears to be asleep. When asked what is wrong pt had no answer but when asked if any pain, anxious or need anything pt would simply say \"no ma'am\". Pt a bit diaphoretic; BS check 116. Pt was given Hydralazine d/t /120. Pt continues to have 2L NC; saturating well; HR 80's. Waiting for BMP to result.  POC to continue until goals met.

## 2025-03-17 NOTE — PROGRESS NOTES
Treatment time: 3 hours  Net UF: 2000 ml     Pre weight: 72.9 kg  Post weight:70.9 kg  EDW: TBD  Crit Line Used: yes Ending Profile: B  Refill Present: y    Access used: R FEM  Cath  Access function: fair with -300 ml/min     Medications or blood products given: R Fem cath-heparin dwells     Regular outpatient schedule: MWF     Summary of response to treatment: Patient tolerated treatment well and without any complications. Patient remained stable throughout entire treatment.     Report given to Viviana Stallings RN and copy of dialysis treatment record placed in chart, to be scanned into EMR.

## 2025-03-17 NOTE — ED NOTES
Patients wife at bedside reports pt has dialysis on T, S.  He had full tx on Tues. When he went for tx on sat the fistula was not working so he did not receive tx. Tx are usually 3 hrs in length. Patients wife states on thurs he noticed the fistula was not normal. No bruit or thrill present for this rn.

## 2025-03-17 NOTE — H&P
V2.0  History and Physical      Name:  Elroy Tyson /Age/Sex: 1945  (79 y.o. male)   MRN & CSN:  [id number] & [id number] Encounter Date/Time: 3/17/2025 11:54 AM EDT   Location:  010B-10 PCP: Kellee Altamirano MD       Hospital Day: 1    Assessment and Plan:   Elroy Tyson is a 79 y.o. male with a pertinent PMHx of ESRD on HD, CAD, HTN, HLD who presented complaining of missed HD.  In the ED he was found to have significant hyperkalemia.    Hyperkalemia  ESRD on HD  Pulmonary edema  -Normally runs  and , did not run on Saturday secondary to fistula occlusion  -Chest x-ray showing mild pulmonary edema  -Received calcium, insulin, dextrose, sodium bicarbonate, Lokelma, in the ED  -Nephrology consulted, planning for emergent HD today  -Critical care consulted for emergent HD line placement    Left upper extremity fistula occlusion  -No bruit or thrill  -Vascular surgery consulted  -Continue Eliquis for now    Transaminitis  -Hold statin  -Check iron and hepatitis panel  -Check right upper quadrant ultrasound    High anion gap metabolic acidosis  Lactic acidosis  -Suspect secondary to missed HD, getting HD today-  -trend lactic    Leukocytosis  -Possibly reactive  -Blood cultures in process    History of CAD  -Continue aspirin 81 mg daily    Goals of care  -CODE STATUS was discussed with patient and family at bedside, patient states that he would not want chest compressions or shocks in the event of cardiac arrest, patient also states that he would not want to be intubated for respiratory distress.  Patient expressed understanding of this and understands the risk  -Will change CODE STATUS to LIMITED CODE, NO CHEST COMPRESSIONS, SHOCKS, DO NOT INTUBATE    Home medications reviewed    Disposition:   Current Living situation: Home  Expected Disposition: Home  Estimated D/C: Greater than 48 hours    Diet ADULT DIET; Regular; Low Potassium (Less than 3000 mg/day)   DVT Prophylaxis []

## 2025-03-17 NOTE — PROGRESS NOTES
that at least one RN has performed a thorough Head to Toe Skin Assessment on the patient. ALL assessment sites listed below have been assessed.      Areas assessed by both nurses: Head, Face, Ears, Shoulders, Back, Chest, Arms, Elbows, Hands, Sacrum. Buttock, Coccyx, Ischium, Legs. Feet and Heels, and Under Medical Devices         Does the Patient have a Wound? No noted wound(s)    Wound Care Orders initiated by RN: No       Laci Prevention initiated by RN: Yes    Pressure Injury (Stage 3,4, Unstageable, DTI, NWPT, and Complex wounds) if present, place Wound referral order by RN under : No    New Ostomies, if present place, Ostomy referral order under : No     Nurse 1 eSignature: Electronically signed by Viviana Stallings RN on 3/17/25 at 7:41 PM EDT    **SHARE this note so that the co-signing nurse can place an eSignature**    Nurse 2 eSignature: {Esignature:[id number]}

## 2025-03-17 NOTE — ED NOTES
ED TO INPATIENT SBAR HANDOFF    Patient Name: Elroy Tyson   Preferred Name: Elroy  : 1945  79 y.o.   Family/Caregiver Present: no   Code Status Order: Full Code  PO Status: NPO:No  Telemetry Order:   C-SSRS: Risk of Suicide: No Risk  Sitter no     Restraints:     Sepsis Risk Score      Situation  Chief Complaint   Patient presents with    Fatigue     Pt arrives with [place] [facility] from home, c/o fatigue, pt missed dialysis on sat. D/t fatigue, he also reports shortness of breath, spo2 88% ra. Pt does not usually wear o2. On 2 L nc spo2  98%. States he started feeling sob/fatigue x7 days ago    Shortness of Breath     Brief Description of Patient's Condition: pt a+o x 3, reports fatigue and sob. Pt on 3L NC for desat 88%. Family at bedside. Non working fistual lt arm.  Mental Status: alert, coherent, thought processes intact, and able to concentrate and follow conversation  Arrived from:Home  Imaging:   XR CHEST 1 VIEW   Final Result   Mild pulmonary edema with a small right pleural effusion.  Infection not   excluded in the appropriate clinical setting.         IR TUNNELED CVC PLACE WO SQ PORT/PUMP > 5 YEARS    (Results Pending)   XR CHEST PORTABLE    (Results Pending)   US ABDOMEN LIMITED W DOPPLER Specify organ? LIVER    (Results Pending)     Abnormal labs:   Abnormal Labs Reviewed   CBC WITH AUTO DIFFERENTIAL - Abnormal; Notable for the following components:       Result Value    WBC 12.4 (*)     RBC 3.79 (*)     Hemoglobin 11.4 (*)     Hematocrit 36.3 (*)     RDW 20.6 (*)     Neutrophils Absolute 10.5 (*)     Lymphocytes Absolute 0.9 (*)     nRBC 4 (*)     Anisocytosis Occasional (*)     Macrocytes Occasional (*)     Polychromasia Occasional (*)     Hypochromia Occasional (*)     Poikilocytes 1+ (*)     Schistocytes Occasional (*)     Acanthocytes Occasional (*)     Ovalocytes Occasional (*)     Target Cells Occasional (*)     All other components within normal limits   COMPREHENSIVE METABOLIC    acetaminophen (TYLENOL) tablet 650 mg (has no administration in time range)     Or   acetaminophen (TYLENOL) suppository 650 mg (has no administration in time range)   dorzolamide-timolol (COSOPT) 2-0.5 % ophthalmic solution 1 drop (has no administration in time range)   losartan (COZAAR) tablet 50 mg ( Oral Automatically Held 3/20/25 0900)   sevelamer (RENVELA) tablet 800 mg (has no administration in time range)   calcium gluconate 1,000 mg in sodium chloride 50 mL (1,000 mg IntraVENous New Bag 3/17/25 1203)     Last documented pain medication administration: n/a  Pertinent or High Risk Medications/Drips: no   If Yes, please provide details:   Blood Product Administration: no  If Yes, please provide details:   Process Protocols/Bundles: N/A    Recommendation  Incomplete STAT orders: urine  Overdue Medications: receiving K+ protocol  Patient Belongings:  with family  Additional Comments:   If any further questions, please call Sending RN at [phone]      Admitting Unit Notification  Name of person notified and time: ICU      Electronically signed by: Electronically signed by Rebecca Mclean RN on 3/17/2025 at 12:21 PM

## 2025-03-17 NOTE — PROGRESS NOTES
Medication Reconciliation    List of medications patient is currently taking is complete.     Source of information: 1. Conversation with patient and family at bedside                                      2. EPIC records                                       3. Patient provided medication list     Notes regarding home medications:   1. Patient is a poor historian.  Updated home medication list to best of ability based on patient provided medication list, dispense history, and EMR.  Discussed meds with Dr. Moya  2. Patient takes Eliquis 2.5 mg po BID in order to keep his grafts open  3. Patient takes Aspirin 81 mg po daily for history of CAD    Favian Maldonado RP, PharmD, BCPS  3/17/2025 12:23 PM

## 2025-03-18 ENCOUNTER — APPOINTMENT (OUTPATIENT)
Dept: GENERAL RADIOLOGY | Age: 80
End: 2025-03-18
Payer: MEDICARE

## 2025-03-18 ENCOUNTER — PREP FOR PROCEDURE (OUTPATIENT)
Dept: VASCULAR SURGERY | Age: 80
End: 2025-03-18

## 2025-03-18 ENCOUNTER — ANESTHESIA EVENT (OUTPATIENT)
Dept: OPERATING ROOM | Age: 80
End: 2025-03-18
Payer: MEDICARE

## 2025-03-18 DIAGNOSIS — Z01.818 PREOP TESTING: Primary | ICD-10-CM

## 2025-03-18 DIAGNOSIS — T82.868A THROMBOSIS OF KIDNEY DIALYSIS ARTERIOVENOUS GRAFT, INITIAL ENCOUNTER: ICD-10-CM

## 2025-03-18 LAB
ALBUMIN SERPL-MCNC: 3.8 G/DL (ref 3.4–5)
ALP SERPL-CCNC: 82 U/L (ref 40–129)
ALT SERPL-CCNC: 749 U/L (ref 10–40)
ANION GAP SERPL CALCULATED.3IONS-SCNC: 26 MMOL/L (ref 3–16)
ANION GAP SERPL CALCULATED.3IONS-SCNC: 27 MMOL/L (ref 3–16)
AST SERPL-CCNC: 583 U/L (ref 15–37)
BASOPHILS # BLD: 0 K/UL (ref 0–0.2)
BASOPHILS NFR BLD: 0.3 %
BILIRUB DIRECT SERPL-MCNC: 0.9 MG/DL (ref 0–0.3)
BILIRUB INDIRECT SERPL-MCNC: 0.7 MG/DL (ref 0–1)
BILIRUB SERPL-MCNC: 1.6 MG/DL (ref 0–1)
BUN SERPL-MCNC: 82 MG/DL (ref 7–20)
BUN SERPL-MCNC: 92 MG/DL (ref 7–20)
CALCIUM SERPL-MCNC: 9.4 MG/DL (ref 8.3–10.6)
CALCIUM SERPL-MCNC: 9.5 MG/DL (ref 8.3–10.6)
CHLORIDE SERPL-SCNC: 95 MMOL/L (ref 99–110)
CHLORIDE SERPL-SCNC: 98 MMOL/L (ref 99–110)
CO2 SERPL-SCNC: 18 MMOL/L (ref 21–32)
CO2 SERPL-SCNC: 19 MMOL/L (ref 21–32)
CREAT SERPL-MCNC: 10 MG/DL (ref 0.8–1.3)
CREAT SERPL-MCNC: 8.8 MG/DL (ref 0.8–1.3)
DEPRECATED RDW RBC AUTO: 20 % (ref 12.4–15.4)
EOSINOPHIL # BLD: 0 K/UL (ref 0–0.6)
EOSINOPHIL NFR BLD: 0 %
GFR SERPLBLD CREATININE-BSD FMLA CKD-EPI: 5 ML/MIN/{1.73_M2}
GFR SERPLBLD CREATININE-BSD FMLA CKD-EPI: 6 ML/MIN/{1.73_M2}
GLUCOSE BLD-MCNC: 126 MG/DL (ref 70–99)
GLUCOSE SERPL-MCNC: 118 MG/DL (ref 70–99)
GLUCOSE SERPL-MCNC: 123 MG/DL (ref 70–99)
HCT VFR BLD AUTO: 31.9 % (ref 40.5–52.5)
HGB BLD-MCNC: 10.5 G/DL (ref 13.5–17.5)
LACTATE BLDV-SCNC: 3.5 MMOL/L (ref 0.4–2)
LYMPHOCYTES # BLD: 0.9 K/UL (ref 1–5.1)
LYMPHOCYTES NFR BLD: 8.1 %
MCH RBC QN AUTO: 30.3 PG (ref 26–34)
MCHC RBC AUTO-ENTMCNC: 32.8 G/DL (ref 31–36)
MCV RBC AUTO: 92.4 FL (ref 80–100)
MONOCYTES # BLD: 0.6 K/UL (ref 0–1.3)
MONOCYTES NFR BLD: 5.6 %
NEUTROPHILS # BLD: 9.9 K/UL (ref 1.7–7.7)
NEUTROPHILS NFR BLD: 86 %
PERFORMED ON: ABNORMAL
PHOSPHATE SERPL-MCNC: 9 MG/DL (ref 2.5–4.9)
PLATELET # BLD AUTO: 111 K/UL (ref 135–450)
PMV BLD AUTO: 9.8 FL (ref 5–10.5)
POTASSIUM SERPL-SCNC: 4.5 MMOL/L (ref 3.5–5.1)
POTASSIUM SERPL-SCNC: 4.9 MMOL/L (ref 3.5–5.1)
POTASSIUM SERPL-SCNC: 4.9 MMOL/L (ref 3.5–5.1)
PROT SERPL-MCNC: 6.4 G/DL (ref 6.4–8.2)
RBC # BLD AUTO: 3.46 M/UL (ref 4.2–5.9)
SODIUM SERPL-SCNC: 140 MMOL/L (ref 136–145)
SODIUM SERPL-SCNC: 143 MMOL/L (ref 136–145)
TRANSFERRIN SERPL-MCNC: 130 MG/DL (ref 200–360)
WBC # BLD AUTO: 11.5 K/UL (ref 4–11)

## 2025-03-18 PROCEDURE — 83605 ASSAY OF LACTIC ACID: CPT

## 2025-03-18 PROCEDURE — 99231 SBSQ HOSP IP/OBS SF/LOW 25: CPT | Performed by: SURGERY

## 2025-03-18 PROCEDURE — 6370000000 HC RX 637 (ALT 250 FOR IP): Performed by: STUDENT IN AN ORGANIZED HEALTH CARE EDUCATION/TRAINING PROGRAM

## 2025-03-18 PROCEDURE — 2500000003 HC RX 250 WO HCPCS: Performed by: STUDENT IN AN ORGANIZED HEALTH CARE EDUCATION/TRAINING PROGRAM

## 2025-03-18 PROCEDURE — 80069 RENAL FUNCTION PANEL: CPT

## 2025-03-18 PROCEDURE — 81256 HFE GENE: CPT

## 2025-03-18 PROCEDURE — 84466 ASSAY OF TRANSFERRIN: CPT

## 2025-03-18 PROCEDURE — 1200000000 HC SEMI PRIVATE

## 2025-03-18 PROCEDURE — 71045 X-RAY EXAM CHEST 1 VIEW: CPT

## 2025-03-18 PROCEDURE — 90935 HEMODIALYSIS ONE EVALUATION: CPT

## 2025-03-18 PROCEDURE — 85025 COMPLETE CBC W/AUTO DIFF WBC: CPT

## 2025-03-18 PROCEDURE — 80076 HEPATIC FUNCTION PANEL: CPT

## 2025-03-18 PROCEDURE — 94760 N-INVAS EAR/PLS OXIMETRY 1: CPT

## 2025-03-18 RX ORDER — SODIUM CHLORIDE 9 MG/ML
INJECTION, SOLUTION INTRAVENOUS PRN
Status: CANCELLED | OUTPATIENT
Start: 2025-03-18

## 2025-03-18 RX ORDER — SODIUM CHLORIDE 0.9 % (FLUSH) 0.9 %
5-40 SYRINGE (ML) INJECTION EVERY 12 HOURS SCHEDULED
Status: CANCELLED | OUTPATIENT
Start: 2025-03-18

## 2025-03-18 RX ORDER — SODIUM CHLORIDE 0.9 % (FLUSH) 0.9 %
5-40 SYRINGE (ML) INJECTION PRN
Status: CANCELLED | OUTPATIENT
Start: 2025-03-18

## 2025-03-18 RX ADMIN — DORZOLAMIDE HYDROCHLORIDE AND TIMOLOL MALEATE 1 DROP: 20; 5 SOLUTION/ DROPS OPHTHALMIC at 22:15

## 2025-03-18 RX ADMIN — ASPIRIN 81 MG: 81 TABLET, CHEWABLE ORAL at 09:33

## 2025-03-18 RX ADMIN — SEVELAMER CARBONATE 800 MG: 800 TABLET, FILM COATED ORAL at 09:33

## 2025-03-18 RX ADMIN — DORZOLAMIDE HYDROCHLORIDE AND TIMOLOL MALEATE 1 DROP: 20; 5 SOLUTION/ DROPS OPHTHALMIC at 09:33

## 2025-03-18 RX ADMIN — SODIUM CHLORIDE, PRESERVATIVE FREE 10 ML: 5 INJECTION INTRAVENOUS at 22:15

## 2025-03-18 NOTE — CONSULTS
GASTROENTEROLOGY INPATIENT CONSULTATION        IDENTIFYING DATA/REASON FOR CONSULTATION   PATIENT:  Elroy Tyson  MRN:  [id number]  ADMIT DATE: 3/17/2025  TIME OF EVALUATION: 3/18/2025 9:09 AM  HOSPITAL STAY:   LOS: 1 day     REASON FOR CONSULTATION: Elevated liver enzymes    HISTORY OF PRESENT ILLNESS   Elroy Tyson is a 79 y.o. male with a PMH of end-stage renal disease on dialysis CAD hypertension hyperlipidemia who presented on 3/17/2025 with fatigue and hyperkalemia. We have been consulted regarding   elevated liver enzymes.  Patient arrived to the ED due to missing dialysis.  He denies fever nausea vomiting abdominal pain hematemesis hematochezia melena.  Denies alcohol or drug use.  Denies personal history or family history for liver disease.  Denies any new medications or herbal supplements.      Prior Endoscopic Evaluations:  Colonoscopy 10/20/2012 Ney Daugherty MD   Postprocedure Dx: polyps in ac and tc     PAST MEDICAL, SURGICAL, FAMILY, and SOCIAL HISTORY     Past Medical History:   Diagnosis Date    Acromioclavicular joint arthritis 2/10/2012    Allergic rhinitis 5/10/2013    Ankle fracture 3/3/2014    Left ankle - February 2014    Back Pain     Cardiac Dysrhythmias     Cardiomegaly     Chronic kidney disease, stage III (moderate) (HCC)     Colon polyps 1/11/2013    Coronary artery disease     Diabetic retinopathy (HCC)     Diverticulosis 1/11/2013    Glaucoma     Glaucoma     HYPERLIPIDEMIA     Hypernatremia 5/26/2012    Hypertension     Lesion of plantar nerve     MI     Right rotator cuff tear 10/28/2011    Vitamin D deficiency 10/8/2010     Past Surgical History:   Procedure Laterality Date    COLONOSCOPY  October 24, 2012    Dr. Dat Brewster    CORONARY ARTERY BYPASS GRAFT  1987    [facility] - single bypass ([facility])     Family History   Problem Relation Age of Onset    Cancer Mother         cervical cancer (?)    Substance Abuse Father         smoker    COPD

## 2025-03-18 NOTE — PROGRESS NOTES
Pt in bed c/o shortness of breath. Pt with some dyspnea at rest. O2 sats low 90's. Pt placed on 2L of O2. Pt sats 98%. Pt states breathing feels better. Secure message to Dr Moya, no new orders. Electronically signed by Justin N. Schoenung, RN on 3/18/2025 at 7:54 PM

## 2025-03-18 NOTE — PROGRESS NOTES
Pt arrived to the floor from the ICU. Pt oriented to room and call light system. Resting in bed with call light in reach.

## 2025-03-18 NOTE — PROGRESS NOTES
Treatment time: 3 hours     Net UF: 2 liters doctor Christianne was aware of it.     Pre weight: 59.3 kg  Post weight: 57.3 kg    Access used: Right Femoral   Access function: Access site located on the Right Groin, had clean dry and intact CVC dressing no signs of infection noted. noted. No redness, hematoma nor swelling was observed. No discharges was seen. Bot ports had good flow. Cleaned site and changed dressing aseptically.    Medications or blood products given: heparin dwell    Regular outpatient schedule: Tuesday. Thursday. Saturday    Summary of response to treatment: 12:20: Treatment set at 2 liters for 3 hours via Right Femoral catheter. Doctor Christianne was aware of it.  Access site located on the Right Groin, had clean dry and intact CVC dressing no signs of infection noted. noted. No redness, hematoma nor swelling was observed. No discharges was seen. Bot ports had good flow. Cleaned site and changed dressing aseptically. parameters set accordingly. Hooked to Hd machine Monitored from time to time.15: 23: Treatment completed. Returned blood aseptically. Hd tolerated.    Copy of dialysis treatment record placed in chart, to be scanned into EMR.

## 2025-03-18 NOTE — PLAN OF CARE
Problem: Chronic Conditions and Co-morbidities  Goal: Patient's chronic conditions and co-morbidity symptoms are monitored and maintained or improved  Outcome: Progressing     Problem: Discharge Planning  Goal: Discharge to home or other facility with appropriate resources  Outcome: Progressing     Problem: ABCDS Injury Assessment  Goal: Absence of physical injury  Outcome: Progressing     Problem: Skin/Tissue Integrity  Goal: Skin integrity remains intact  Description: 1.  Monitor for areas of redness and/or skin breakdown  2.  Assess vascular access sites hourly  3.  Every 4-6 hours minimum:  Change oxygen saturation probe site  4.  Every 4-6 hours:  If on nasal continuous positive airway pressure, respiratory therapy assess nares and determine need for appliance change or resting period  Outcome: Progressing     Problem: Safety - Adult  Goal: Free from fall injury  Outcome: Progressing     Problem: Neurosensory - Adult  Goal: Achieves stable or improved neurological status  Outcome: Progressing  Goal: Achieves maximal functionality and self care  Outcome: Progressing     Problem: Musculoskeletal - Adult  Goal: Return mobility to safest level of function  Outcome: Progressing  Goal: Return ADL status to a safe level of function  Outcome: Progressing     Problem: Gastrointestinal - Adult  Goal: Maintains adequate nutritional intake  Outcome: Progressing     Problem: Genitourinary - Adult  Goal: Absence of urinary retention  Outcome: Progressing  Goal: Urinary catheter remains patent  Outcome: Progressing     Problem: Metabolic/Fluid and Electrolytes - Adult  Goal: Electrolytes maintained within normal limits  Outcome: Progressing  Goal: Glucose maintained within prescribed range  Outcome: Progressing     Problem: Respiratory - Adult  Goal: Achieves optimal ventilation and oxygenation  Outcome: Progressing

## 2025-03-18 NOTE — PROGRESS NOTES
V2.0    [facility] [place]      Name:  Elroy Tyson /Age/Sex: 1945  (79 y.o. male)   MRN & CSN:  [id number] & [id number] Encounter Date/Time: 3/18/2025 7:20 AM EDT   Location:  [id number] PCP: Kellee Altamirano MD     Attending:Ney Moya DO       Hospital Day: 2  Brief Hospital Course  Elroy Tyson is a 79 y.o. male with pertinent PMHx of ESRD on HD, CAD, HTN, HLD who presented complaining of missed HD.  In the ED he was found to have significant hyperkalemia and concern for AV fistula occlusion.  Assessment & Plan     Hyperkalemia, resolved  ESRD on HD  Pulmonary edema, improved  - S/p emergent HD yesterday  - Continue femoral temporary HD line for now  - Nephrology following, note reviewed today 3/18, planning for repeat HD run today    Left upper extremity AV fistula occlusion  - Vascular surgery consulted, note reviewed today 3/18, planning for OR 3/19 for thrombectomy  - Holding Eliquis    Transaminitis  - LFTs downtrending  - Right upper quadrant ultrasound showing hepatic steatosis  - Gastroenterology consulted, note reviewed today 3/18 checking liver serologies    High anion gap metabolic acidosis, improved  Lactic acidosis, improved  - Suspect secondary to missed HD, correcting with HD    History of CAD  - Continue aspirin 81 mg daily      Diet ADULT DIET; Regular; Low Potassium (Less than 3000 mg/day)  Diet NPO Exceptions are: Sips of Water with Meds   DVT Prophylaxis [x] Lovenox, []  Heparin, [] SCDs, [] Ambulation,  [] Eliquis, [] Xarelto  [] Coumadin   Code Status DNR-CCA   Disposition From: Home  Expected Disposition: TBD  Estimated Date of Discharge: Greater than 48 hours           Subjective:     Chief Complaint: Missed HD    Patient was seen and examined today lying in bed  Mentation improved somewhat, able to have more appropriate conversations  Denies any pain anywhere  Denies any chest pain or shortness of breath  Overall feels okay    Review of Systems:

## 2025-03-18 NOTE — PROGRESS NOTES
AM assessment revealed bruit heard during auscultation, thrill able to be palpated. RN notified nephro.    Electronically signed by Evelia Walsh RN on 3/18/2025 at 11:12 AM

## 2025-03-18 NOTE — PROGRESS NOTES
4 Eyes Skin Assessment     NAME:  Elroy Tyson  YOB: 1945  MEDICAL RECORD NUMBER:  [id number]    The patient is being assessed for  Transfer to New Unit    I agree that at least one RN has performed a thorough Head to Toe Skin Assessment on the patient. ALL assessment sites listed below have been assessed.      Areas assessed by both nurses:    Head, Face, Ears, Shoulders, Back, Chest, Arms, Elbows, Hands, Sacrum. Buttock, Coccyx, Ischium, Legs. Feet and Heels, and Under Medical Devices         Does the Patient have a Wound? No noted wound(s)       Laci Prevention initiated by RN: Yes  Wound Care Orders initiated by RN: No    Pressure Injury (Stage 3,4, Unstageable, DTI, NWPT, and Complex wounds) if present, place Wound referral order by RN under : No    New Ostomies, if present place, Ostomy referral order under : No     Nurse 1 eSignature: Electronically signed by Jami Barbosa RN on 3/18/25 at 3:53 PM EDT    **SHARE this note so that the co-signing nurse can place an eSignature**    Nurse 2 eSignature: Electronically signed by Justin N. Schoenung, RN on 3/19/25 at 7:44 AM EDT

## 2025-03-18 NOTE — PROGRESS NOTES
[facility]  Progress Note    3/18/2025 11:06 AM    Chief Complaint: Fatigue     Reason for Consult: Fistula Evaluation    Subjective: Pt seen resting in bed, alert to self, unable to tell me month or where he is presently    Vital Signs:  Vitals:    03/18/25 0700 03/18/25 0800 03/18/25 0900 03/18/25 1000   BP: (!) 152/87 (!) 149/80 (!) 169/108 (!) 172/92   Pulse: 74 92 84 79   Resp: 20 (!) 31 19 11   Temp:  98 °F (36.7 °C)     TempSrc:  Oral     SpO2: 100% 100% 99% 97%   Weight:       Height:           I/O:    Intake/Output Summary (Last 24 hours) at 3/18/2025 1106  Last data filed at 3/17/2025 1700  Gross per 24 hour   Intake 787.52 ml   Output 2500 ml   Net -1712.48 ml       Physical Exam:   General: no apparent distress, alert and oriented to person, afebrile  Chest/Lungs: non labored breathing no tachypnea, retractions or cyanosis  Cardiac: Heart regular rate and rhythm  Extremities: normal strength, tone, and muscle mass, no deformities, no significant edema to BLE  -LUE - Left Hand warm to touch, motor/sensory intact, no thrill noted to forearm AVG    Labs:   Lab Results   Component Value Date/Time     03/18/2025 06:10 AM    K 4.9 03/18/2025 06:10 AM    K 4.9 03/18/2025 06:10 AM    CL 95 03/18/2025 06:10 AM    CO2 18 03/18/2025 06:10 AM    BUN 92 03/18/2025 06:10 AM    CREATININE 10.0 03/18/2025 06:10 AM    GFRAA >60 06/04/2014 08:02 AM    GFRAA >60 01/11/2013 09:43 AM    LABGLOM 5 03/18/2025 06:10 AM    GLUCOSE 118 03/18/2025 06:10 AM    PHOS 9.0 03/18/2025 06:10 AM    CALCIUM 9.4 03/18/2025 06:10 AM     Lab Results   Component Value Date/Time    WBC 11.5 03/18/2025 06:10 AM    RBC 3.46 03/18/2025 06:10 AM    HGB 10.5 03/18/2025 06:10 AM    HCT 31.9 03/18/2025 06:10 AM    MCV 92.4 03/18/2025 06:10 AM    RDW 20.0 03/18/2025 06:10 AM     03/18/2025 06:10 AM   No results found for: \"INR\", \"PROTIME\"     Scheduled Meds:    sodium chloride flush  5-40 mL IntraVENous 2 times  per day    dorzolamide-timolol  1 drop Both Eyes BID    [Held by provider] losartan  50 mg Oral Daily    sevelamer  800 mg Oral TID WC    [Held by provider] apixaban  2.5 mg Oral BID    aspirin  81 mg Oral Daily    [Held by provider] spironolactone  50 mg Oral Daily     Continuous Infusions:    dextrose      sodium chloride         Assessment:  -LUE Brachial-Brachial AVG - placed by Dr. Pickard in 2023  -No thrill noted to AVG  -Alert to self only  -HD - Tuesday and Saturday      Plan:   -Hold Eliquis - pt's wife reports he was on this for AVG patency  -HD via Temporary VasCath - per Nephrology - will need to continue VasCath until confirmation of functioning AVG post-thrombectomy tomorrow  -OR tomorrow for LUE AVG Thrombectomy and Fistulagram with Dr. Sher  -NPO after midnight  -Further per Primary    All pertinent information and plan of care discussed with Dr. Magdy Sher.    All questions and concerns were addressed with the patient. I have discussed the above stated plan with the patient and the nurse. The patient verbalized understanding and agreed with the plan.    Thank you for allowing to us to participate in the care of Elroy Tyson      Electronically signed by KIM Vazquez CNP on 3/18/2025 at 11:06 AM       Vascular Staff    I independently performed an evaluation on Elroy Tyson.  I have reviewed the above documentation completed by LIVAN Vazquez.  Please see my additional contributions to the HPI, physical exam, assessment, and medical decision making.    No acute events overnight  No significant pain  Tolerating dialysis via temp cath    OR tomorrow for left arm AV graft thrombectomy and fistulogram  Continue dialysis via temp cath per nephrology  N.p.o. at midnight  Further per primary      Magdy Sher MD, [facility]  3/18/2025  4:46 PM

## 2025-03-18 NOTE — PROGRESS NOTES
Report called to Jami 4WRN, all questions answered. Pt to transfer to room 2134 after dialysis.    Electronically signed by Evelia Walsh RN on 3/18/2025 at 1:03 PM

## 2025-03-18 NOTE — PROGRESS NOTES
4 Eyes Skin Assessment     NAME:  Elroy Tyson  YOB: 1945  MEDICAL RECORD NUMBER:  [id number]    The patient is being assessed for  Transfer to New Unit    I agree that at least one RN has performed a thorough Head to Toe Skin Assessment on the patient. ALL assessment sites listed below have been assessed.      Areas assessed by both nurses:    Head, Face, Ears, Shoulders, Back, Chest, Arms, Elbows, Hands, Sacrum. Buttock, Coccyx, Ischium, Legs. Feet and Heels, and Under Medical Devices         Does the Patient have a Wound? No noted wound(s)       Laci Prevention initiated by RN: Yes  Wound Care Orders initiated by RN: No    Pressure Injury (Stage 3,4, Unstageable, DTI, NWPT, and Complex wounds) if present, place Wound referral order by RN under : No    New Ostomies, if present place, Ostomy referral order under : No     Nurse 1 eSignature: Electronically signed by Evelia Walsh RN on 3/18/25 at 1:21 PM EDT    **SHARE this note so that the co-signing nurse can place an eSignature**    Nurse 2 eSignature: {Esignature:[id number]}

## 2025-03-18 NOTE — PROGRESS NOTES
Department of Internal Medicine  Nephrology Progress Note      REASON FOR CONSULTATION:  Emergent dialysis.     HISTORY OF PRESENTING ILLNESS:  A 79-year-old  male with past medical history significant for hypertension; diabetes mellitus type 2; PLA2R membranous nephropathy; end-stage renal disease, on hemodialysis Tuesday and Saturday, came to ER complaining of shortness of breath and dyspnea on exertion.  The patient went to his dialysis unit on Saturday, but could not get the treatment because of clotted AV fistula.  He came to ER complaining of shortness of breath and dyspnea on exertion.  Admitted for further care and management.  Routine labs showed potassium was 6.7, bicarb of 13, , creatinine 13.7, and lactic acid of 5.6.  Case discussed with the emergency room team for emergent treatment of hyperkalemia and severe metabolic acidosis.  Arrangements being made for the patient to be admitted in ICU in need of temporary dialysis catheter and emergent dialysis treatment.    Events noted  S/p emergent HD last night .   Labs reviewed .  Does not feel well.   REVIEW OF SYSTEMS:  No SOB/QUINTEROS .    Family at bed side     Physical Exam:    VITALS:  BP (!) 166/102   Pulse 77   Temp 98 °F (36.7 °C)   Resp 21 Comment: manuaaly counted  Ht 1.6 m (5' 3\")   Wt 59.3 kg (130 lb 11.7 oz)   SpO2 90%   BMI 23.16 kg/m²   24HR INTAKE/OUTPUT:    Intake/Output Summary (Last 24 hours) at 3/18/2025 1346  Last data filed at 3/17/2025 1700  Gross per 24 hour   Intake 500 ml   Output 2500 ml   Net -2000 ml       Constitutional: Looks comfortable   Respiratory:  CTA  Gastrointestinal: No  tenderness.  Normal Bowel Sounds  Cardiovascular:  S1, S2 RRR   Edema:  Lower Extremity has no edema    DATA:    CBC:  Lab Results   Component Value Date/Time    WBC 11.5 03/18/2025 06:10 AM    RBC 3.46 03/18/2025 06:10 AM    HGB 10.5 03/18/2025 06:10 AM    HCT 31.9 03/18/2025 06:10 AM    MCV 92.4 03/18/2025 06:10 AM    [facility]

## 2025-03-18 NOTE — PROGRESS NOTES
Spoke to lab about BMP not resulted; stated they did not receive, will redraw and send. Reached out to NP to check if lactic needs to be redrawn, last one drawn at 1045 and was 3.9; pt was dialyzed once arrived to floor, no labs have been drawn since. Will wait for response or orders.

## 2025-03-19 ENCOUNTER — ANESTHESIA (OUTPATIENT)
Dept: OPERATING ROOM | Age: 80
End: 2025-03-19
Payer: MEDICARE

## 2025-03-19 LAB
ACANTHOCYTES BLD QL SMEAR: ABNORMAL
ALBUMIN SERPL-MCNC: 3.7 G/DL (ref 3.4–5)
ALP SERPL-CCNC: 85 U/L (ref 40–129)
ALT SERPL-CCNC: 589 U/L (ref 10–40)
ANION GAP SERPL CALCULATED.3IONS-SCNC: 25 MMOL/L (ref 3–16)
ANISOCYTOSIS BLD QL SMEAR: ABNORMAL
AST SERPL-CCNC: 327 U/L (ref 15–37)
BACTERIA URNS QL MICRO: ABNORMAL /HPF
BASOPHILS # BLD: 0 K/UL (ref 0–0.2)
BASOPHILS NFR BLD: 0.4 %
BILIRUB DIRECT SERPL-MCNC: 1.1 MG/DL (ref 0–0.3)
BILIRUB INDIRECT SERPL-MCNC: 0.7 MG/DL (ref 0–1)
BILIRUB SERPL-MCNC: 1.8 MG/DL (ref 0–1)
BILIRUB UR QL STRIP.AUTO: NEGATIVE
BUN SERPL-MCNC: 81 MG/DL (ref 7–20)
CALCIUM SERPL-MCNC: 9.2 MG/DL (ref 8.3–10.6)
CHLORIDE SERPL-SCNC: 97 MMOL/L (ref 99–110)
CLARITY UR: ABNORMAL
CO2 SERPL-SCNC: 18 MMOL/L (ref 21–32)
COARSE GRAN CASTS #/AREA URNS LPF: ABNORMAL /LPF (ref 0–2)
COLOR UR: ABNORMAL
CREAT SERPL-MCNC: 8.7 MG/DL (ref 0.8–1.3)
DEPRECATED RDW RBC AUTO: 19.8 % (ref 12.4–15.4)
EOSINOPHIL # BLD: 0 K/UL (ref 0–0.6)
EOSINOPHIL NFR BLD: 0.1 %
EPI CELLS #/AREA URNS HPF: ABNORMAL /HPF (ref 0–5)
FINE GRAN CASTS #/AREA URNS HPF: ABNORMAL /LPF (ref 0–2)
GFR SERPLBLD CREATININE-BSD FMLA CKD-EPI: 6 ML/MIN/{1.73_M2}
GLUCOSE BLD-MCNC: 101 MG/DL (ref 70–99)
GLUCOSE BLD-MCNC: 132 MG/DL (ref 70–99)
GLUCOSE BLD-MCNC: 140 MG/DL (ref 70–99)
GLUCOSE SERPL-MCNC: 131 MG/DL (ref 70–99)
GLUCOSE UR STRIP.AUTO-MCNC: 250 MG/DL
HCT VFR BLD AUTO: 36 % (ref 40.5–52.5)
HGB BLD-MCNC: 11.6 G/DL (ref 13.5–17.5)
HGB UR QL STRIP.AUTO: ABNORMAL
HYALINE CASTS #/AREA URNS LPF: ABNORMAL /LPF (ref 0–2)
KETONES UR STRIP.AUTO-MCNC: ABNORMAL MG/DL
LEUKOCYTE ESTERASE UR QL STRIP.AUTO: ABNORMAL
LYMPHOCYTES # BLD: 1 K/UL (ref 1–5.1)
LYMPHOCYTES NFR BLD: 11.5 %
MCH RBC QN AUTO: 30.2 PG (ref 26–34)
MCHC RBC AUTO-ENTMCNC: 32.4 G/DL (ref 31–36)
MCV RBC AUTO: 93.3 FL (ref 80–100)
MONOCYTES # BLD: 0.5 K/UL (ref 0–1.3)
MONOCYTES NFR BLD: 6 %
NEUTROPHILS # BLD: 6.9 K/UL (ref 1.7–7.7)
NEUTROPHILS NFR BLD: 82 %
NITRITE UR QL STRIP.AUTO: NEGATIVE
OVALOCYTES BLD QL SMEAR: ABNORMAL
PERFORMED ON: ABNORMAL
PH UR STRIP.AUTO: 7 [PH] (ref 5–8)
PLATELET # BLD AUTO: 97 K/UL (ref 135–450)
PLATELET BLD QL SMEAR: ABNORMAL
PMV BLD AUTO: 9.4 FL (ref 5–10.5)
POIKILOCYTOSIS BLD QL SMEAR: ABNORMAL
POLYCHROMASIA BLD QL SMEAR: ABNORMAL
POTASSIUM SERPL-SCNC: 5.1 MMOL/L (ref 3.5–5.1)
PROT SERPL-MCNC: 6.3 G/DL (ref 6.4–8.2)
PROT UR STRIP.AUTO-MCNC: >=1000 MG/DL
RBC # BLD AUTO: 3.85 M/UL (ref 4.2–5.9)
RBC #/AREA URNS HPF: ABNORMAL /HPF (ref 0–4)
SLIDE REVIEW: ABNORMAL
SODIUM SERPL-SCNC: 140 MMOL/L (ref 136–145)
SP GR UR STRIP.AUTO: 1.02 (ref 1–1.03)
TARGETS BLD QL SMEAR: ABNORMAL
UA COMPLETE W REFLEX CULTURE PNL UR: YES
UA DIPSTICK W REFLEX MICRO PNL UR: YES
URN SPEC COLLECT METH UR: ABNORMAL
UROBILINOGEN UR STRIP-ACNC: 0.2 E.U./DL
WAXY CASTS #/AREA URNS LPF: ABNORMAL /LPF
WBC # BLD AUTO: 8.4 K/UL (ref 4–11)
WBC #/AREA URNS HPF: ABNORMAL /HPF (ref 0–5)

## 2025-03-19 PROCEDURE — C1894 INTRO/SHEATH, NON-LASER: HCPCS | Performed by: SURGERY

## 2025-03-19 PROCEDURE — 87186 SC STD MICRODIL/AGAR DIL: CPT

## 2025-03-19 PROCEDURE — 3600000017 HC SURGERY HYBRID ADDL 15MIN: Performed by: SURGERY

## 2025-03-19 PROCEDURE — 87086 URINE CULTURE/COLONY COUNT: CPT

## 2025-03-19 PROCEDURE — 6360000004 HC RX CONTRAST MEDICATION: Performed by: SURGERY

## 2025-03-19 PROCEDURE — 2709999900 HC NON-CHARGEABLE SUPPLY: Performed by: SURGERY

## 2025-03-19 PROCEDURE — 2500000003 HC RX 250 WO HCPCS: Performed by: SURGERY

## 2025-03-19 PROCEDURE — 05CA0ZZ EXTIRPATION OF MATTER FROM LEFT BRACHIAL VEIN, OPEN APPROACH: ICD-10-PCS | Performed by: SURGERY

## 2025-03-19 PROCEDURE — 2500000003 HC RX 250 WO HCPCS: Performed by: STUDENT IN AN ORGANIZED HEALTH CARE EDUCATION/TRAINING PROGRAM

## 2025-03-19 PROCEDURE — C1769 GUIDE WIRE: HCPCS | Performed by: SURGERY

## 2025-03-19 PROCEDURE — 3700000000 HC ANESTHESIA ATTENDED CARE: Performed by: SURGERY

## 2025-03-19 PROCEDURE — C1757 CATH, THROMBECTOMY/EMBOLECT: HCPCS | Performed by: SURGERY

## 2025-03-19 PROCEDURE — 1200000000 HC SEMI PRIVATE

## 2025-03-19 PROCEDURE — 6360000002 HC RX W HCPCS

## 2025-03-19 PROCEDURE — C2623 CATH, TRANSLUMIN, DRUG-COAT: HCPCS | Performed by: SURGERY

## 2025-03-19 PROCEDURE — 6360000002 HC RX W HCPCS: Performed by: SURGERY

## 2025-03-19 PROCEDURE — 2500000003 HC RX 250 WO HCPCS

## 2025-03-19 PROCEDURE — 6370000000 HC RX 637 (ALT 250 FOR IP): Performed by: STUDENT IN AN ORGANIZED HEALTH CARE EDUCATION/TRAINING PROGRAM

## 2025-03-19 PROCEDURE — 36415 COLL VENOUS BLD VENIPUNCTURE: CPT

## 2025-03-19 PROCEDURE — 36831 OPEN THROMBECT AV FISTULA: CPT | Performed by: SURGERY

## 2025-03-19 PROCEDURE — 85025 COMPLETE CBC W/AUTO DIFF WBC: CPT

## 2025-03-19 PROCEDURE — 81001 URINALYSIS AUTO W/SCOPE: CPT

## 2025-03-19 PROCEDURE — 80048 BASIC METABOLIC PNL TOTAL CA: CPT

## 2025-03-19 PROCEDURE — 7100000001 HC PACU RECOVERY - ADDTL 15 MIN: Performed by: SURGERY

## 2025-03-19 PROCEDURE — 3700000001 HC ADD 15 MINUTES (ANESTHESIA): Performed by: SURGERY

## 2025-03-19 PROCEDURE — 057A3Z1 DILATION OF LEFT BRACHIAL VEIN USING DRUG-COATED BALLOON, PERCUTANEOUS APPROACH: ICD-10-PCS | Performed by: SURGERY

## 2025-03-19 PROCEDURE — 87077 CULTURE AEROBIC IDENTIFY: CPT

## 2025-03-19 PROCEDURE — 7100000000 HC PACU RECOVERY - FIRST 15 MIN: Performed by: SURGERY

## 2025-03-19 PROCEDURE — 80076 HEPATIC FUNCTION PANEL: CPT

## 2025-03-19 PROCEDURE — 2580000003 HC RX 258: Performed by: SURGERY

## 2025-03-19 PROCEDURE — 03C80ZZ EXTIRPATION OF MATTER FROM LEFT BRACHIAL ARTERY, OPEN APPROACH: ICD-10-PCS | Performed by: SURGERY

## 2025-03-19 PROCEDURE — 3600000007 HC SURGERY HYBRID BASE: Performed by: SURGERY

## 2025-03-19 RX ORDER — SODIUM CHLORIDE 0.9 % (FLUSH) 0.9 %
5-40 SYRINGE (ML) INJECTION PRN
Status: DISCONTINUED | OUTPATIENT
Start: 2025-03-19 | End: 2025-03-19 | Stop reason: HOSPADM

## 2025-03-19 RX ORDER — DEXAMETHASONE SODIUM PHOSPHATE 4 MG/ML
INJECTION, SOLUTION INTRA-ARTICULAR; INTRALESIONAL; INTRAMUSCULAR; INTRAVENOUS; SOFT TISSUE
Status: DISCONTINUED | OUTPATIENT
Start: 2025-03-19 | End: 2025-03-19 | Stop reason: SDUPTHER

## 2025-03-19 RX ORDER — SODIUM CHLORIDE 0.9 % (FLUSH) 0.9 %
5-40 SYRINGE (ML) INJECTION EVERY 12 HOURS SCHEDULED
Status: DISCONTINUED | OUTPATIENT
Start: 2025-03-19 | End: 2025-03-19 | Stop reason: HOSPADM

## 2025-03-19 RX ORDER — METOPROLOL TARTRATE 1 MG/ML
INJECTION, SOLUTION INTRAVENOUS
Status: DISCONTINUED | OUTPATIENT
Start: 2025-03-19 | End: 2025-03-19 | Stop reason: SDUPTHER

## 2025-03-19 RX ORDER — GLYCOPYRROLATE 0.2 MG/ML
INJECTION INTRAMUSCULAR; INTRAVENOUS
Status: DISCONTINUED | OUTPATIENT
Start: 2025-03-19 | End: 2025-03-19 | Stop reason: SDUPTHER

## 2025-03-19 RX ORDER — FENTANYL CITRATE 0.05 MG/ML
25 INJECTION, SOLUTION INTRAMUSCULAR; INTRAVENOUS EVERY 5 MIN PRN
Status: DISCONTINUED | OUTPATIENT
Start: 2025-03-19 | End: 2025-03-19 | Stop reason: HOSPADM

## 2025-03-19 RX ORDER — NALOXONE HYDROCHLORIDE 0.4 MG/ML
INJECTION, SOLUTION INTRAMUSCULAR; INTRAVENOUS; SUBCUTANEOUS PRN
Status: DISCONTINUED | OUTPATIENT
Start: 2025-03-19 | End: 2025-03-19 | Stop reason: HOSPADM

## 2025-03-19 RX ORDER — SODIUM CHLORIDE 9 MG/ML
INJECTION, SOLUTION INTRAVENOUS PRN
Status: DISCONTINUED | OUTPATIENT
Start: 2025-03-19 | End: 2025-03-19 | Stop reason: HOSPADM

## 2025-03-19 RX ORDER — ROCURONIUM BROMIDE 10 MG/ML
INJECTION, SOLUTION INTRAVENOUS
Status: DISCONTINUED | OUTPATIENT
Start: 2025-03-19 | End: 2025-03-19 | Stop reason: SDUPTHER

## 2025-03-19 RX ORDER — IOPAMIDOL 612 MG/ML
INJECTION, SOLUTION INTRAVASCULAR
Status: DISCONTINUED | OUTPATIENT
Start: 2025-03-19 | End: 2025-03-19 | Stop reason: ALTCHOICE

## 2025-03-19 RX ORDER — FENTANYL CITRATE 50 UG/ML
INJECTION, SOLUTION INTRAMUSCULAR; INTRAVENOUS
Status: DISCONTINUED | OUTPATIENT
Start: 2025-03-19 | End: 2025-03-19 | Stop reason: SDUPTHER

## 2025-03-19 RX ORDER — PROPOFOL 10 MG/ML
INJECTION, EMULSION INTRAVENOUS
Status: DISCONTINUED | OUTPATIENT
Start: 2025-03-19 | End: 2025-03-19 | Stop reason: SDUPTHER

## 2025-03-19 RX ORDER — EPHEDRINE SULFATE/0.9% NACL/PF 25 MG/5 ML
SYRINGE (ML) INTRAVENOUS
Status: DISCONTINUED | OUTPATIENT
Start: 2025-03-19 | End: 2025-03-19 | Stop reason: SDUPTHER

## 2025-03-19 RX ORDER — ONDANSETRON 2 MG/ML
4 INJECTION INTRAMUSCULAR; INTRAVENOUS
Status: DISCONTINUED | OUTPATIENT
Start: 2025-03-19 | End: 2025-03-19 | Stop reason: HOSPADM

## 2025-03-19 RX ORDER — LIDOCAINE HYDROCHLORIDE 20 MG/ML
INJECTION, SOLUTION EPIDURAL; INFILTRATION; INTRACAUDAL; PERINEURAL
Status: DISCONTINUED | OUTPATIENT
Start: 2025-03-19 | End: 2025-03-19 | Stop reason: SDUPTHER

## 2025-03-19 RX ADMIN — FENTANYL CITRATE 25 MCG: 50 INJECTION INTRAMUSCULAR; INTRAVENOUS at 14:34

## 2025-03-19 RX ADMIN — SODIUM CHLORIDE 2000 MG: 9 INJECTION, SOLUTION INTRAVENOUS at 14:12

## 2025-03-19 RX ADMIN — SODIUM CHLORIDE, PRESERVATIVE FREE 10 ML: 5 INJECTION INTRAVENOUS at 20:26

## 2025-03-19 RX ADMIN — EPHEDRINE SULFATE 2.5 MG: 5 INJECTION INTRAVENOUS at 14:42

## 2025-03-19 RX ADMIN — DORZOLAMIDE HYDROCHLORIDE AND TIMOLOL MALEATE 1 DROP: 20; 5 SOLUTION/ DROPS OPHTHALMIC at 20:26

## 2025-03-19 RX ADMIN — SODIUM CHLORIDE: 9 INJECTION, SOLUTION INTRAVENOUS at 14:03

## 2025-03-19 RX ADMIN — ROCURONIUM BROMIDE 50 MG: 10 SOLUTION INTRAVENOUS at 14:08

## 2025-03-19 RX ADMIN — SODIUM CHLORIDE, PRESERVATIVE FREE 10 ML: 5 INJECTION INTRAVENOUS at 09:08

## 2025-03-19 RX ADMIN — ASPIRIN 81 MG: 81 TABLET, CHEWABLE ORAL at 09:08

## 2025-03-19 RX ADMIN — METOPROLOL TARTRATE 1 MG: 5 INJECTION INTRAVENOUS at 15:36

## 2025-03-19 RX ADMIN — DORZOLAMIDE HYDROCHLORIDE AND TIMOLOL MALEATE 1 DROP: 20; 5 SOLUTION/ DROPS OPHTHALMIC at 09:08

## 2025-03-19 RX ADMIN — SUGAMMADEX 200 MG: 100 INJECTION, SOLUTION INTRAVENOUS at 15:36

## 2025-03-19 RX ADMIN — EPHEDRINE SULFATE 5 MG: 5 INJECTION INTRAVENOUS at 14:29

## 2025-03-19 RX ADMIN — LIDOCAINE HYDROCHLORIDE 100 MG: 20 INJECTION, SOLUTION EPIDURAL; INFILTRATION; INTRACAUDAL; PERINEURAL at 14:06

## 2025-03-19 RX ADMIN — PROPOFOL 50 MG: 10 INJECTION, EMULSION INTRAVENOUS at 14:08

## 2025-03-19 RX ADMIN — FENTANYL CITRATE 25 MCG: 50 INJECTION INTRAMUSCULAR; INTRAVENOUS at 14:04

## 2025-03-19 RX ADMIN — DEXAMETHASONE SODIUM PHOSPHATE 4 MG: 4 INJECTION, SOLUTION INTRAMUSCULAR; INTRAVENOUS at 14:14

## 2025-03-19 RX ADMIN — GLYCOPYRROLATE 0.2 MG: 0.2 INJECTION INTRAMUSCULAR; INTRAVENOUS at 14:10

## 2025-03-19 ASSESSMENT — PAIN - FUNCTIONAL ASSESSMENT
PAIN_FUNCTIONAL_ASSESSMENT: NONE - DENIES PAIN
PAIN_FUNCTIONAL_ASSESSMENT: 0-10

## 2025-03-19 NOTE — PLAN OF CARE
Problem: Chronic Conditions and Co-morbidities  Goal: Patient's chronic conditions and co-morbidity symptoms are monitored and maintained or improved  Outcome: Progressing     Problem: Discharge Planning  Goal: Discharge to home or other facility with appropriate resources  Outcome: Progressing     Problem: ABCDS Injury Assessment  Goal: Absence of physical injury  Outcome: Progressing     Problem: Skin/Tissue Integrity  Goal: Skin integrity remains intact  Description: 1.  Monitor for areas of redness and/or skin breakdown  2.  Assess vascular access sites hourly  3.  Every 4-6 hours minimum:  Change oxygen saturation probe site  4.  Every 4-6 hours:  If on nasal continuous positive airway pressure, respiratory therapy assess nares and determine need for appliance change or resting period  Outcome: Progressing     Problem: Safety - Adult  Goal: Free from fall injury  Outcome: Progressing     Problem: Neurosensory - Adult  Goal: Achieves stable or improved neurological status  Outcome: Progressing  Goal: Achieves maximal functionality and self care  Outcome: Progressing     Problem: Musculoskeletal - Adult  Goal: Return mobility to safest level of function  Outcome: Progressing  Goal: Return ADL status to a safe level of function  Outcome: Progressing     Problem: Gastrointestinal - Adult  Goal: Maintains adequate nutritional intake  Outcome: Progressing     Problem: Genitourinary - Adult  Goal: Absence of urinary retention  Outcome: Progressing  Goal: Urinary catheter remains patent  Outcome: Progressing     Problem: Metabolic/Fluid and Electrolytes - Adult  Goal: Electrolytes maintained within normal limits  Outcome: Progressing  Goal: Glucose maintained within prescribed range  Outcome: Progressing     Problem: Respiratory - Adult  Goal: Achieves optimal ventilation and oxygenation  Outcome: Progressing     Problem: Confusion  Goal: Confusion, delirium, dementia, or psychosis is improved or at

## 2025-03-19 NOTE — ANESTHESIA PRE PROCEDURE
anticoagulation therapy:..                 Abdominal: normal exam            Vascular: negative vascular ROS.         Other Findings:             Anesthesia Plan      general     ASA 4     (Patient would like to revoke DNR status to FULL CODE for perioperative period.  Questions and concerns addressed.  DNR paperwork in chart.)  Induction: intravenous.    MIPS: Postoperative opioids intended and Prophylactic antiemetics administered.  Anesthetic plan and risks discussed with patient and spouse.      Plan discussed with CRNA.                This pre-anesthesia assessment may be used as a history and physical.    DOS STAFF ADDENDUM:    Pt seen and examined, chart reviewed (including anesthesia, drug and allergy history).  No interval changes to history and physical examination.  Anesthetic plan, risks, benefits, alternatives, and personnel involved discussed with patient. Questions and concerns addressed.  Patient(family) verbalized an understanding and agrees to proceed.      Hakan Mcclure MD  March 19, 2025  1:42 PM

## 2025-03-19 NOTE — PROGRESS NOTES
Patient admitted to PACU from OR. Patient very drowsy, open eyes to name. Resp easy unlabored on 4L NC with SaO2 97% on earlobe. Left forearm surgical glue dressing dry, JOSE with edges well approximated. Left arm elevated on pillow. Pulses palpable bilat. Moving extremities spontaneously and to command. IV patent to right forearm. VSS.

## 2025-03-19 NOTE — BRIEF OP NOTE
Brief Postoperative Note      Patient: Elroy Tyson  YOB: 1945  MRN: [id number]    Date of Procedure: 3/19/2025    Pre-Op Diagnosis Codes:      * Thrombosis of renal dialysis arteriovenous graft [T82.868A]    Post-Op Diagnosis: Same       Procedure(s):  LEFT UPPER EXTREMITY ARTERIOVENOUS FISTULA THROMBECTOMY WITH FISTULAGRAM AND  ANGIOPLASTY    Surgeon(s):  Magdy Sher MD    Assistant:  Surgical Assistant: Doris Nelson    Anesthesia: General    Estimated Blood Loss (mL): less than 50     Complications: None    Specimens:   * No specimens in log *    Implants:  * No implants in log *      Drains:   [REMOVED] External Urinary Catheter (Removed)   Site Assessment Clean,dry & intact 03/18/25 0800   Placement Initiated 03/18/25 0800   Securement Method Securing device (Describe) 03/18/25 0800   Catheter Care Suction Canister/Tubing changed;Catheter/Wick replaced 03/18/25 0800   Perineal Care Yes 03/18/25 0800   Suction 40 mmgHg continuous 03/18/25 0800       Findings:  Infection Present At Time Of Surgery (PATOS) (choose all levels that have infection present):  No infection present  Other Findings: Significant thrombus within AV graft.  Removed via thrombectomy with good inflow bleeding.  Angiogram showing widely patent proximal anastomosis.  Stenosis in the distal brachial vein near the elbow between 2 stents.  This resolved after balloon venoplasty.  Good flow with good thrill at completion of procedure.    Plan:  AV graft clear to use at prior access sites.  Surgical site marked.  Please do not access around surgical site marked.      [id number]    Electronically signed by Magdy Sher MD on 3/19/2025 at 4:00 PM

## 2025-03-19 NOTE — PROGRESS NOTES
Physician Progress Note      PATIENT:               PARKER SNOW  [facility] #:                  [id number]  :                       1945  ADMIT DATE:       3/17/2025 8:33 AM  DISCH DATE:  RESPONDING  PROVIDER #:        Ney Moya DO          QUERY TEXT:    Pt with ESRD admitted with thrombosed AV fistula resulting in missed HD. Pt   noted to have pulmonary edema. If possible, please document in the progress   notes and discharge summary if you are evaluating and/or treating any of the   following:    The medical record reflects the following:  Risk Factors: ESRD with missed HD  Clinical Indicators: SOB, CXR on admission showed \"Mild pulmonary edema with a   small right pleural effusion\" and pulmonary edema documented in H&P and   progress notes  Treatment: nephrology consult, central line inserted and HD resumed  Options provided:  -- Acute pulmonary edema  -- Chronic pulmonary edema  -- Acute on chronic pulmonary edema  -- Other - I will add my own diagnosis  -- Disagree - Not applicable / Not valid  -- Disagree - Clinically unable to determine / Unknown  -- Refer to Clinical Documentation Reviewer    PROVIDER RESPONSE TEXT:    Acute pulmonary edema    Query created by: Shante Pickens on 3/19/2025 4:54 AM      Electronically signed by:  Ney Moya DO 3/19/2025 3:53 PM

## 2025-03-19 NOTE — PLAN OF CARE
Problem: Chronic Conditions and Co-morbidities  Goal: Patient's chronic conditions and co-morbidity symptoms are monitored and maintained or improved  3/19/2025 1208 by April Mcbride RN  Outcome: Progressing     Problem: Discharge Planning  Goal: Discharge to home or other facility with appropriate resources  3/19/2025 1208 by April Mcbride RN  Outcome: Progressing     Problem: ABCDS Injury Assessment  Goal: Absence of physical injury  3/19/2025 1208 by April Mcbride RN  Outcome: Progressing     Problem: Skin/Tissue Integrity  Goal: Skin integrity remains intact  Description: 1.  Monitor for areas of redness and/or skin breakdown  2.  Assess vascular access sites hourly  3.  Every 4-6 hours minimum:  Change oxygen saturation probe site  4.  Every 4-6 hours:  If on nasal continuous positive airway pressure, respiratory therapy assess nares and determine need for appliance change or resting period  3/19/2025 1208 by April Mcbride RN  Outcome: Progressing  Flowsheets (Taken 3/19/2025 1207)  Skin Integrity Remains Intact: Monitor for areas of redness and/or skin breakdown

## 2025-03-19 NOTE — CARE COORDINATION
3/19 Patient in procedure at time of visit. Electronically signed by Tomasa Leo RN on 3/19/2025 at 3:00 PM

## 2025-03-19 NOTE — PROGRESS NOTES
[facility]  Progress Note    3/19/2025 8:50 AM    Chief Complaint: Fatigue     Reason for Consult: Fistula Evaluation    Subjective: Pt seen resting in bed, alert to self, unable to tell me month or where he is presently, planning for OR later today for Left AVG Thrombectomy with Dr. Sher    Vital Signs:  Vitals:    03/18/25 1945 03/18/25 2321 03/19/25 0449 03/19/25 0730   BP: (!) 165/104 (!) 154/110 (!) 165/98 (!) 150/92   Pulse: 79 73 69 75   Resp: 20 18 20    Temp: 97.7 °F (36.5 °C) 97.5 °F (36.4 °C) 97.2 °F (36.2 °C) 97.7 °F (36.5 °C)   TempSrc: Oral Oral Oral Oral   SpO2: 100% 95% 98%    Weight:   53 kg (116 lb 13.5 oz)    Height:           I/O:    Intake/Output Summary (Last 24 hours) at 3/19/2025 0850  Last data filed at 3/19/2025 0330  Gross per 24 hour   Intake 310 ml   Output 10 ml   Net 300 ml       Physical Exam:   General: no apparent distress, alert and oriented to person, afebrile  Chest/Lungs: non labored breathing no tachypnea, retractions or cyanosis  Cardiac: Heart regular rate and rhythm  Extremities: normal strength, tone, and muscle mass, no deformities, no significant edema to BLE  -LUE - Left Hand warm to touch, motor/sensory intact, no thrill noted to forearm AVG    Labs:   Lab Results   Component Value Date/Time     03/19/2025 05:36 AM    K 5.1 03/19/2025 05:36 AM    CL 97 03/19/2025 05:36 AM    CO2 18 03/19/2025 05:36 AM    BUN 81 03/19/2025 05:36 AM    CREATININE 8.7 03/19/2025 05:36 AM    GFRAA >60 06/04/2014 08:02 AM    GFRAA >60 01/11/2013 09:43 AM    LABGLOM 6 03/19/2025 05:36 AM    GLUCOSE 131 03/19/2025 05:36 AM    PHOS 9.0 03/18/2025 06:10 AM    CALCIUM 9.2 03/19/2025 05:36 AM     Lab Results   Component Value Date/Time    WBC 8.4 03/19/2025 05:36 AM    RBC 3.85 03/19/2025 05:36 AM    HGB 11.6 03/19/2025 05:36 AM    HCT 36.0 03/19/2025 05:36 AM    MCV 93.3 03/19/2025 05:36 AM    RDW 19.8 03/19/2025 05:36 AM    PLT 97 03/19/2025 05:36 AM   No  results found for: \"INR\", \"PROTIME\"     Scheduled Meds:    sodium chloride flush  5-40 mL IntraVENous 2 times per day    dorzolamide-timolol  1 drop Both Eyes BID    [Held by provider] losartan  50 mg Oral Daily    sevelamer  800 mg Oral TID WC    [Held by provider] apixaban  2.5 mg Oral BID    aspirin  81 mg Oral Daily    [Held by provider] spironolactone  50 mg Oral Daily     Continuous Infusions:    dextrose      sodium chloride         Assessment:  -LUE Brachial-Brachial AVG - placed by Dr. Pickard in 2023  -No thrill noted to AVG  -Alert to self only  -HD - Tuesday and Saturday      Plan:   -Hold Eliquis - pt's wife reports he was on this for AVG patency  -HD via Temporary VasCath - per Nephrology - will need to continue VasCath until confirmation of functioning AVG post-thrombectomy   -OR today for LUE AVG Thrombectomy and Fistulagram with Dr. Sher  -Continue NPO except for sips of water with meds  -Further per Primary    All pertinent information and plan of care discussed with Dr. Magdy Sher.    All questions and concerns were addressed with the patient. I have discussed the above stated plan with the patient and the nurse. The patient verbalized understanding and agreed with the plan.    Thank you for allowing to us to participate in the care of Elroy Tyson      Electronically signed by KIM Vazquez CNP on 3/19/2025 at 8:50 AM       Vascular Staff    I independently performed an evaluation on Elroy Tyson.  I have reviewed the above documentation completed by LIVAN Vazquez.  Please see my additional contributions to the HPI, physical exam, assessment, and medical decision making.    No acute events overnight  Downgraded out of ICU  Tolerating dialysis via temp cath    OR today for AV graft thrombectomy and fistulogram  Further per primary  Will follow along        Magdy Sher MD, [facility]  3/19/2025  3:52 PM

## 2025-03-19 NOTE — PROGRESS NOTES
V2.0    [facility] [place]      Name:  Elroy Tyson /Age/Sex: 1945  (79 y.o. male)   MRN & CSN:  [id number] & [id number] Encounter Date/Time: 3/19/2025 7:20 AM EDT   Location:  [id number] PCP: Kellee Altamirano MD     Attending:Ney Moya DO       Hospital Day: 3  Brief Hospital Course  Elroy Tyson is a 79 y.o. male with pertinent PMHx of ESRD on HD, CAD, HTN, HLD who presented complaining of missed HD.  In the ED he was found to have significant hyperkalemia and concern for AV fistula occlusion.  Assessment & Plan     Hyperkalemia, resolved  ESRD on HD  Pulmonary edema, improved  - Continue femoral temporary HD line for now, was placed on 3/16  - S/p HD 3/16 and 3/17  - Nephrology following    Left upper extremity AV fistula occlusion  - Vascular surgery consulted, planning for OR today for LUE AVG thrombectomy and fistulogram  - Holding Eliquis    Transaminitis  - LFTs continue to downtrend, suspect this was likely related to hepatic congestion  - Right upper quadrant ultrasound showing hepatic steatosis  - Gastroenterology following    High anion gap metabolic acidosis, improved  Lactic acidosis, improved  - Suspect secondary to missed HD, correcting with HD    History of CAD  - Continue aspirin 81 mg daily      Diet Diet NPO Exceptions are: Sips of Water with Meds   DVT Prophylaxis [x] Lovenox, []  Heparin, [] SCDs, [] Ambulation,  [] Eliquis, [] Xarelto  [] Coumadin   Code Status DNR-CCA   Disposition From: Home  Expected Disposition: TBD  Estimated Date of Discharge: 3/21-3/22           Subjective:     Chief Complaint: Missed HD    Patient was seen and examined today laying in bed  Says he feels okay  Denies any chest pain or shortness of breath  Not having any cough  Not having any abdominal pain  Review of Systems:      Pertinent positives and negatives discussed in HPI    Objective:     Intake/Output Summary (Last 24 hours) at 3/19/2025 0756  Last data filed at 3/19/2025  03/18/25  0610 03/19/25  0536   * 583* 327*   * 749* 589*   BILITOT 1.3* 1.6* 1.8*   ALKPHOS 89 82 85     Lipids:   Lab Results   Component Value Date/Time    CHOL 117 06/04/2014 08:02 AM    HDL 50 06/04/2014 08:02 AM    HDL 42 05/25/2012 09:34 AM    TRIG 81 06/04/2014 08:02 AM     Hemoglobin A1C:   Lab Results   Component Value Date/Time    LABA1C 6.8 06/04/2014 08:02 AM     TSH:   Lab Results   Component Value Date/Time    TSH 1.17 06/04/2014 08:02 AM    TSH 0.81 09/13/2013 10:10 AM     Troponin: No results found for: \"TROPONINT\"  Lactic Acid:   Recent Labs     03/17/25  2117 03/18/25  0610   LACTA 2.3* 3.5*     BNP: No results for input(s): \"PROBNP\" in the last 72 hours.  UA:  Lab Results   Component Value Date/Time    NITRU Negative 03/19/2025 03:33 AM    COLORU DARK YELLOW 03/19/2025 03:33 AM    PHUR 7.0 03/19/2025 03:33 AM    PHUR 7.0 04/27/2022 09:00 AM    WBCUA 10-20 03/19/2025 03:33 AM    RBCUA 3-4 03/19/2025 03:33 AM    BACTERIA Rare 03/19/2025 03:33 AM    CLARITYU CLOUDY 03/19/2025 03:33 AM    LEUKOCYTESUR TRACE 03/19/2025 03:33 AM    UROBILINOGEN 0.2 03/19/2025 03:33 AM    BILIRUBINUR Negative 03/19/2025 03:33 AM    BLOODU SMALL 03/19/2025 03:33 AM    GLUCOSEU 250 03/19/2025 03:33 AM    GLUCOSEU NEGATIVE 02/11/2011 10:48 AM    KETUA TRACE 03/19/2025 03:33 AM     Urine Cultures: No results found for: \"LABURIN\"  Blood Cultures:   Lab Results   Component Value Date/Time    BC  03/17/2025 09:44 AM     No Growth to date.  Any change in status will be called.     Lab Results   Component Value Date/Time    BLOODCULT2  03/17/2025 09:44 AM     No Growth to date.  Any change in status will be called.     Organism: No results found for: \"ORG\"      Electronically signed by Ney Moya DO on 3/19/2025 at 7:56 AM

## 2025-03-19 NOTE — PROGRESS NOTES
INPATIENT PROGRESS NOTE        IDENTIFYING DATA/REASON FOR CONSULTATION   PATIENT:  Elroy Tyson  MRN:  [id number]  ADMIT DATE: 3/17/2025  TIME OF EVALUATION: 3/19/2025 2:26 PM  HOSPITAL STAY:   LOS: 2 days   CONSULTING PHYSICIAN: Ney Moya DO   REASON FOR CONSULTATION:    Subjective:    Patient seen in follow up   Today patient denies abdominal pain nausea or vomiting. Reports he feels \"winded\". During evaluation patient on room air.     MEDICATIONS   SCHEDULED:  sodium chloride flush, 5-40 mL, 2 times per day  ceFAZolin (ANCEF) IV, 2,000 mg, On Call to OR  sodium chloride flush, 5-40 mL, 2 times per day  dorzolamide-timolol, 1 drop, BID  [Held by provider] losartan, 50 mg, Daily  sevelamer, 800 mg, TID WC  [Held by provider] apixaban, 2.5 mg, BID  aspirin, 81 mg, Daily  [Held by provider] spironolactone, 50 mg, Daily      FLUIDS/DRIPS:     sodium chloride      dextrose      sodium chloride       PRNs: sodium chloride flush, 5-40 mL, PRN  sodium chloride, , PRN  glucose, 4 tablet, PRN  dextrose bolus, 125 mL, PRN   Or  dextrose bolus, 250 mL, PRN  glucagon (rDNA), 1 mg, PRN  dextrose, , Continuous PRN  sodium chloride flush, 5-40 mL, PRN  sodium chloride, , PRN  ondansetron, 4 mg, Q8H PRN   Or  ondansetron, 4 mg, Q6H PRN  polyethylene glycol, 17 g, Daily PRN  acetaminophen, 650 mg, Q6H PRN   Or  acetaminophen, 650 mg, Q6H PRN  heparin (porcine), 2,800 Units, PRN  hydrALAZINE, 10 mg, Q6H PRN      ALLERGIES:    Allergies   Allergen Reactions    Benicar [Olmesartan Medoxomil]      headaches    Imdur [Isosorbide Mononitrate]      Headaches    Nitroglycerin      headache         PHYSICAL EXAM   VITALS:  BP (!) 156/105   Pulse 58   Temp 97.8 °F (36.6 °C) (Oral)   Resp 16   Ht 1.6 m (5' 3\")   Wt 53 kg (116 lb 13.5 oz)   SpO2 95%   BMI 20.70 kg/m²   TEMPERATURE:  Current - Temp: 97.8 °F (36.6 °C); Max - Temp  Av.5 °F (36.4 °C)  Min: 96.3 °F (35.7 °C)  Max: 97.9 °F (36.6 °C)    Physical Exam:  General  supplements.  -RUQ ultrasound 3/17 shown cholelithiasis without evidence of cholecystitis.  Mild hepatic steatosis.  -hepatitis labs are negative.   -Liver serology labs are ordered. Will follow up with results.   -Monitor and trend LFTs        End-stage renal disease on dialysis  -nephrology following      Hyperkalemia  -trended down from 6.7 to 4  -nephrology and primary following      Brachial-Brachial AVG   -LUE AVG Thrombectomy and Fistulagram procedure today with vascular .   -Eliquis on hold  -vascular following    RECOMMENDATIONS:    Liver serology labs needs to be collected   Monitor and trend LFTs   Surgery today for LUE AVG Thrombectomy and Fistulagram with vascular    If you have any questions or need any further information, please feel free to contact us [phone].  Thank you for allowing us to participate in the care of Elroy Tyson.    The note was completed using Dragon voice recognition transcription. Every effort was made to ensure accuracy; however, inadvertent transcription errors may be present despite my best efforts to edit errors.    Martina Bell PA-C 3/19/2025 at 2:26 PM

## 2025-03-19 NOTE — PROGRESS NOTES
Patient more awake and alert. Resp easy unlabored on 2L NC with SaO2 99%. Left forearm surgical glue dressing dry,intact with edges well approximated and JOSE. Patient moving all extremities bilat.  Strond hand grasps bilat. Patient denies C/O pain or nausea. VSS. Patient stable to transfer to room 4134.

## 2025-03-19 NOTE — OP NOTE
[facility]          [address] [place] [address]                            OPERATIVE REPORT      PATIENT NAME: PARKER SNOW             : 1945  MED REC NO: [id number]                      ROOM: OR  ACCOUNT NO: [id number]                       ADMIT DATE: 2025  PROVIDER: Magdy Sher MD      DATE OF PROCEDURE:  2025    SURGEON:  Magdy Sher MD    PROCEDURES PERFORMED:    1. Left arm arteriovenous graft thrombectomy.  2. Fistulogram.  3. Balloon venoplasty, left brachial vein.    PREOPERATIVE DIAGNOSIS:  Thrombosed left arm arteriovenous graft.    POSTOPERATIVE DIAGNOSIS:  Thrombosed left arm arteriovenous graft.    ASSISTANT:  Doris Rouse.    ANESTHESIA:  General endotracheal anesthesia.    ESTIMATED BLOOD LOSS:  Less than 50 mL.    IMPLANTS:  None.    SPECIMEN:  None.    COMPLICATIONS:  None.    INTRAOPERATIVE FINDINGS:  Significant thrombus within the graft, which was removed with thrombectomy catheter with good inflow bleeding as well as some backbleeding from the venous end.  Angiogram showing widely patent proximal anastomosis.  There was stenosis distally in the brachial vein between 2 previously placed stents.  This resolved after balloon venoplasty, good flow in the distal brachial vein, axillary vein, subclavian vein, innominate vein, and SVC without stenosis.    INDICATIONS FOR PROCEDURE:  The patient is a 79-year-old male with a history of end-stage renal disease, on dialysis.  He presented to the ED due to thrombosis of his graft.  He had not undergone dialysis in a few days and had stopped taking Eliquis, and his graft had subsequently thrombosed.  On evaluation, it was noted that he has had multiple prior procedures on his graft at the access center.  The patient had a non-tunneled catheter placed for emergent dialysis, which he has been tolerating without issue.  The patient presents today for graft  I then placed the 11-Romansh sheath distally into the venous end, again cinching the vessel loop down to hold it in place and obtained a distal fistulogram including outflow and central venogram.  This showed a stenosis between 2 previously placed stents, but the remainder of the vessels and central veins were widely patent without stenosis.  I then advanced a stiff-angled Glidewire across the area of stenosis under fluoroscopic guidance and advanced a 6 x 60 mm IN.PACT drug-coated balloon to the area of stenosis, inflating with a 3-minute inflation time to 14 atmospheres.  After treatment completion, venogram obtained showing no residual stenosis between the 2 stents and no other areas of stenosis.  The sheath, balloon, and wire were all removed.  The graft was clamped distally to prevent venous backbleeding.  I then proceeded to close the graft with 5-0 Prolene suture in a running fashion.  After this was closed, flow was restored.  There was a good palpable thrill within the graft as well as in the brachial vein in the upper arm.  The wound was then copiously irrigated with vancomycin and saline and closed in multiple layers using 3-0 Vicryl for subcutaneous tissues and 4-0 Monocryl for skin.  Skin was covered with Dermabond.  I then marked the area of our dissection to assure no access at that location.  The patient was then awakened from anesthesia, extubated in the OR suite, and taken to the PACU in stable condition.  There were no known immediate postoperative complications.  All instrument, needle, and sponge counts were correct at the end of procedure.          BENNY DIAZ MD      D:  03/19/2025 16:14:57     T:  03/19/2025 18:35:18     ND/S  Job #:  [id number]     Doc#:  [id number]

## 2025-03-19 NOTE — ANESTHESIA POSTPROCEDURE EVALUATION
[facility] Department of Anesthesiology  Post-Anesthesia Note       Name:  Elroy Tyson                                  Age:  79 y.o.  MRN:  [id number]     Last Vitals & Oxygen Saturation: BP (!) 156/92   Pulse 66   Temp 97 °F (36.1 °C) (Temporal)   Resp 17   Ht 1.6 m (5' 3\")   Wt 53 kg (116 lb 13.5 oz)   SpO2 99%   BMI 20.70 kg/m²   Patient Vitals for the past 4 hrs:   BP Temp Temp src Pulse Resp SpO2   03/19/25 1659 -- -- -- 66 17 --   03/19/25 1658 -- -- -- 67 16 99 %   03/19/25 1657 -- -- -- 68 18 99 %   03/19/25 1656 -- -- -- 68 21 99 %   03/19/25 1655 -- -- -- 68 19 97 %   03/19/25 1654 -- -- -- 70 21 98 %   03/19/25 1653 -- -- -- 68 22 100 %   03/19/25 1652 -- -- -- 70 22 94 %   03/19/25 1651 -- -- -- 66 22 99 %   03/19/25 1650 -- -- -- 66 23 95 %   03/19/25 1649 -- -- -- 70 21 99 %   03/19/25 1648 -- -- -- 69 15 99 %   03/19/25 1647 -- -- -- 66 15 94 %   03/19/25 1646 -- -- -- 69 22 99 %   03/19/25 1645 (!) 156/92 -- -- 67 21 100 %   03/19/25 1644 -- -- -- 70 15 100 %   03/19/25 1643 -- -- -- 74 17 99 %   03/19/25 1642 -- -- -- 69 24 --   03/19/25 1641 -- -- -- 68 23 100 %   03/19/25 1640 -- -- -- 68 19 100 %   03/19/25 1639 -- -- -- 68 21 99 %   03/19/25 1638 -- 97 °F (36.1 °C) Temporal 68 22 91 %   03/19/25 1637 -- -- -- 70 22 98 %   03/19/25 1636 -- -- -- 69 20 --   03/19/25 1635 -- -- -- 67 17 --   03/19/25 1634 -- -- -- 68 23 94 %   03/19/25 1633 -- -- -- 68 17 92 %   03/19/25 1632 -- -- -- 66 21 (!) 85 %   03/19/25 1631 -- -- -- 69 21 --   03/19/25 1630 (!) 152/103 -- -- 63 16 97 %   03/19/25 1629 -- -- -- 67 20 97 %   03/19/25 1628 -- -- -- 66 17 97 %   03/19/25 1627 -- -- -- 66 22 --   03/19/25 1626 -- -- -- 67 22 --   03/19/25 1625 -- -- -- 64 23 --   03/19/25 1624 -- -- -- 65 21 --   03/19/25 1623 -- -- -- 64 19 --   03/19/25 1622 -- -- -- 66 22 96 %   03/19/25 1621 -- -- -- 66 18 98 %   03/19/25 1620 -- -- -- 64 22 --   03/19/25 1619 -- -- -- 66 22 --   03/19/25 1618 -- -- -- 63 22

## 2025-03-19 NOTE — PROGRESS NOTES
Department of Internal Medicine  Nephrology Progress Note      REASON FOR CONSULTATION:  Emergent dialysis.     HISTORY OF PRESENTING ILLNESS:  A 79-year-old  male with past medical history significant for hypertension; diabetes mellitus type 2; PLA2R membranous nephropathy; end-stage renal disease, on hemodialysis Tuesday and Saturday, came to ER complaining of shortness of breath and dyspnea on exertion.  The patient went to his dialysis unit on Saturday, but could not get the treatment because of clotted AV fistula.  He came to ER complaining of shortness of breath and dyspnea on exertion.  Admitted for further care and management.  Routine labs showed potassium was 6.7, bicarb of 13, , creatinine 13.7, and lactic acid of 5.6.  Case discussed with the emergency room team for emergent treatment of hyperkalemia and severe metabolic acidosis.  Arrangements being made for the patient to be admitted in ICU in need of temporary dialysis catheter and emergent dialysis treatment.    OR today for thrombectomy .   Labs reviewed .    REVIEW OF SYSTEMS:  No SOB/QUINTEROS .    Family at bed side     Physical Exam:    VITALS:  BP (!) 156/105   Pulse 58   Temp 97.8 °F (36.6 °C) (Oral)   Resp 16   Ht 1.6 m (5' 3\")   Wt 53 kg (116 lb 13.5 oz)   SpO2 95%   BMI 20.70 kg/m²   24HR INTAKE/OUTPUT:    Intake/Output Summary (Last 24 hours) at 3/19/2025 1507  Last data filed at 3/19/2025 1412  Gross per 24 hour   Intake 365 ml   Output 10 ml   Net 355 ml       Constitutional: Looks comfortable   Respiratory:  CTA  Gastrointestinal: No  tenderness.  Normal Bowel Sounds  Cardiovascular:  S1, S2 RRR   Edema:  Lower Extremity has no edema    DATA:    CBC:  Lab Results   Component Value Date/Time    WBC 8.4 03/19/2025 05:36 AM    RBC 3.85 03/19/2025 05:36 AM    HGB 11.6 03/19/2025 05:36 AM    HCT 36.0 03/19/2025 05:36 AM    MCV 93.3 03/19/2025 05:36 AM    MCH 30.2 03/19/2025 05:36 AM    MCHC 32.4 03/19/2025 05:36 AM     RDW 19.8 03/19/2025 05:36 AM    PLT 97 03/19/2025 05:36 AM    MPV 9.4 03/19/2025 05:36 AM     CMP:  Lab Results   Component Value Date/Time     03/19/2025 05:36 AM    K 5.1 03/19/2025 05:36 AM    CL 97 03/19/2025 05:36 AM    CO2 18 03/19/2025 05:36 AM    BUN 81 03/19/2025 05:36 AM    CREATININE 8.7 03/19/2025 05:36 AM    GFRAA >60 06/04/2014 08:02 AM    GFRAA >60 01/11/2013 09:43 AM    AGRATIO 1.3 03/17/2025 10:01 AM    LABGLOM 6 03/19/2025 05:36 AM    GLUCOSE 131 03/19/2025 05:36 AM    CALCIUM 9.2 03/19/2025 05:36 AM    BILITOT 1.8 03/19/2025 05:36 AM    ALKPHOS 85 03/19/2025 05:36 AM     03/19/2025 05:36 AM     03/19/2025 05:36 AM      Hepatic Function Panel:   Lab Results   Component Value Date/Time    ALKPHOS 85 03/19/2025 05:36 AM     03/19/2025 05:36 AM     03/19/2025 05:36 AM    BILITOT 1.8 03/19/2025 05:36 AM    BILIDIR 1.1 03/19/2025 05:36 AM    IBILI 0.7 03/19/2025 05:36 AM      Phosphorus:   Lab Results   Component Value Date/Time    PHOS 9.0 03/18/2025 06:10 AM       ASSESSMENT:  Principal Problem:    Hyperkalemia  Active Problems:    ESRD needing dialysis (HCC)  Resolved Problems:    * No resolved hospital problems. *      PLAN:  1-ESRD HD 2 /wk   Suggest to increase 3w/k .  Next HD in am.     2- Clotted AV graft   Thrombectomy later today .     3- HTN  Controlled .    4- Anemia CKD  Hb noted.  Off epo .    5-Osteodystrophy   Labs reviewed .     Pt and family updated .       Purvi Faust MD, FACP

## 2025-03-20 LAB
ALBUMIN SERPL-MCNC: 3.6 G/DL (ref 3.4–5)
ALBUMIN SERPL-MCNC: 3.7 G/DL (ref 3.4–5)
ALBUMIN/GLOB SERPL: 1.5 {RATIO} (ref 1.1–2.2)
ALP SERPL-CCNC: 75 U/L (ref 40–129)
ALP SERPL-CCNC: 76 U/L (ref 40–129)
ALT SERPL-CCNC: 329 U/L (ref 10–40)
ALT SERPL-CCNC: 398 U/L (ref 10–40)
ANION GAP SERPL CALCULATED.3IONS-SCNC: 23 MMOL/L (ref 3–16)
ANION GAP SERPL CALCULATED.3IONS-SCNC: 31 MMOL/L (ref 3–16)
ANISOCYTOSIS BLD QL SMEAR: ABNORMAL
AST SERPL-CCNC: 137 U/L (ref 15–37)
AST SERPL-CCNC: 151 U/L (ref 15–37)
BASOPHILS # BLD: 0 K/UL (ref 0–0.2)
BASOPHILS NFR BLD: 0 %
BILIRUB DIRECT SERPL-MCNC: 1.1 MG/DL (ref 0–0.3)
BILIRUB INDIRECT SERPL-MCNC: 0.6 MG/DL (ref 0–1)
BILIRUB SERPL-MCNC: 1.7 MG/DL (ref 0–1)
BILIRUB SERPL-MCNC: 1.8 MG/DL (ref 0–1)
BUN SERPL-MCNC: 102 MG/DL (ref 7–20)
BUN SERPL-MCNC: 63 MG/DL (ref 7–20)
BURR CELLS BLD QL SMEAR: ABNORMAL
CALCIUM SERPL-MCNC: 8.9 MG/DL (ref 8.3–10.6)
CALCIUM SERPL-MCNC: 9.3 MG/DL (ref 8.3–10.6)
CHLORIDE SERPL-SCNC: 96 MMOL/L (ref 99–110)
CHLORIDE SERPL-SCNC: 97 MMOL/L (ref 99–110)
CO2 SERPL-SCNC: 14 MMOL/L (ref 21–32)
CO2 SERPL-SCNC: 21 MMOL/L (ref 21–32)
CREAT SERPL-MCNC: 6.5 MG/DL (ref 0.8–1.3)
CREAT SERPL-MCNC: 9.9 MG/DL (ref 0.8–1.3)
DEPRECATED RDW RBC AUTO: 20.1 % (ref 12.4–15.4)
EOSINOPHIL # BLD: 0 K/UL (ref 0–0.6)
EOSINOPHIL NFR BLD: 0 %
GFR SERPLBLD CREATININE-BSD FMLA CKD-EPI: 5 ML/MIN/{1.73_M2}
GFR SERPLBLD CREATININE-BSD FMLA CKD-EPI: 8 ML/MIN/{1.73_M2}
GLUCOSE BLD-MCNC: 138 MG/DL (ref 70–99)
GLUCOSE SERPL-MCNC: 118 MG/DL (ref 70–99)
GLUCOSE SERPL-MCNC: 151 MG/DL (ref 70–99)
HCT VFR BLD AUTO: 36.5 % (ref 40.5–52.5)
HGB BLD-MCNC: 11.9 G/DL (ref 13.5–17.5)
LYMPHOCYTES # BLD: 0.4 K/UL (ref 1–5.1)
LYMPHOCYTES NFR BLD: 6 %
MCH RBC QN AUTO: 30.8 PG (ref 26–34)
MCHC RBC AUTO-ENTMCNC: 32.6 G/DL (ref 31–36)
MCV RBC AUTO: 94.6 FL (ref 80–100)
MONOCYTES # BLD: 0.1 K/UL (ref 0–1.3)
MONOCYTES NFR BLD: 1 %
NEUTROPHILS # BLD: 6.7 K/UL (ref 1.7–7.7)
NEUTROPHILS NFR BLD: 93 %
NEUTS VAC BLD QL SMEAR: PRESENT
NRBC BLD-RTO: 7 /100 WBC
OVALOCYTES BLD QL SMEAR: ABNORMAL
PERFORMED ON: ABNORMAL
PHOSPHATE SERPL-MCNC: 12.6 MG/DL (ref 2.5–4.9)
PLATELET # BLD AUTO: 93 K/UL (ref 135–450)
PLATELET BLD QL SMEAR: ABNORMAL
PMV BLD AUTO: 10.4 FL (ref 5–10.5)
POIKILOCYTOSIS BLD QL SMEAR: ABNORMAL
POLYCHROMASIA BLD QL SMEAR: ABNORMAL
POTASSIUM SERPL-SCNC: 4.3 MMOL/L (ref 3.5–5.1)
POTASSIUM SERPL-SCNC: 6.2 MMOL/L (ref 3.5–5.1)
POTASSIUM SERPL-SCNC: 6.2 MMOL/L (ref 3.5–5.1)
PROT SERPL-MCNC: 6 G/DL (ref 6.4–8.2)
PROT SERPL-MCNC: 6.2 G/DL (ref 6.4–8.2)
RBC # BLD AUTO: 3.86 M/UL (ref 4.2–5.9)
SCHISTOCYTES BLD QL SMEAR: ABNORMAL
SLIDE REVIEW: ABNORMAL
SODIUM SERPL-SCNC: 140 MMOL/L (ref 136–145)
SODIUM SERPL-SCNC: 142 MMOL/L (ref 136–145)
TARGETS BLD QL SMEAR: ABNORMAL
WBC # BLD AUTO: 7.2 K/UL (ref 4–11)

## 2025-03-20 PROCEDURE — 85025 COMPLETE CBC W/AUTO DIFF WBC: CPT

## 2025-03-20 PROCEDURE — 99024 POSTOP FOLLOW-UP VISIT: CPT | Performed by: SURGERY

## 2025-03-20 PROCEDURE — 94760 N-INVAS EAR/PLS OXIMETRY 1: CPT

## 2025-03-20 PROCEDURE — 36415 COLL VENOUS BLD VENIPUNCTURE: CPT

## 2025-03-20 PROCEDURE — 2500000003 HC RX 250 WO HCPCS: Performed by: SURGERY

## 2025-03-20 PROCEDURE — 6370000000 HC RX 637 (ALT 250 FOR IP): Performed by: NURSE PRACTITIONER

## 2025-03-20 PROCEDURE — 6370000000 HC RX 637 (ALT 250 FOR IP): Performed by: SURGERY

## 2025-03-20 PROCEDURE — 80053 COMPREHEN METABOLIC PANEL: CPT

## 2025-03-20 PROCEDURE — 1200000000 HC SEMI PRIVATE

## 2025-03-20 PROCEDURE — 90935 HEMODIALYSIS ONE EVALUATION: CPT

## 2025-03-20 RX ADMIN — APIXABAN 2.5 MG: 2.5 TABLET, FILM COATED ORAL at 20:41

## 2025-03-20 RX ADMIN — SODIUM CHLORIDE, PRESERVATIVE FREE 5 ML: 5 INJECTION INTRAVENOUS at 12:30

## 2025-03-20 RX ADMIN — SEVELAMER CARBONATE 800 MG: 800 TABLET, FILM COATED ORAL at 12:30

## 2025-03-20 RX ADMIN — DORZOLAMIDE HYDROCHLORIDE AND TIMOLOL MALEATE 1 DROP: 20; 5 SOLUTION/ DROPS OPHTHALMIC at 20:42

## 2025-03-20 RX ADMIN — SEVELAMER CARBONATE 800 MG: 800 TABLET, FILM COATED ORAL at 18:00

## 2025-03-20 RX ADMIN — DORZOLAMIDE HYDROCHLORIDE AND TIMOLOL MALEATE 1 DROP: 20; 5 SOLUTION/ DROPS OPHTHALMIC at 12:30

## 2025-03-20 RX ADMIN — ASPIRIN 81 MG: 81 TABLET, CHEWABLE ORAL at 12:30

## 2025-03-20 RX ADMIN — SODIUM CHLORIDE, PRESERVATIVE FREE 10 ML: 5 INJECTION INTRAVENOUS at 20:43

## 2025-03-20 NOTE — CARE COORDINATION
Case Management Assessment  Initial Evaluation    Date/Time of Evaluation: 3/20/2025 1:33 PM  Assessment Completed by: Tomasa Leo RN    If patient is discharged prior to next notation, then this note serves as note for discharge by case management.    Patient Name: Elroy Tyson                     YOB: 1945  Diagnosis: Hyperkalemia [E87.5]  Acute pulmonary edema (HCC) [J81.0]  Elevated liver enzymes [R74.8]  ESRD needing dialysis (HCC) [N18.6, Z99.2]  Acute respiratory failure with hypoxia (HCC) [J96.01]                     Date / Time: 3/17/2025  8:33 AM    Patient Admission Status: Inpatient   Readmission Risk (Low < 19, Mod (19-27), High > 27): Readmission Risk Score: 19.5    Current PCP: Kellee Altamirano MD  PCP verified by CM? (P) Yes    Chart Reviewed: Yes      History Provided by: Child/Family, Medical Record  Patient Orientation: Alert and Oriented    Patient Cognition: Alert (sleepy)    Hospitalization in the last 30 days (Readmission):  No    If yes, Readmission Assessment in  Navigator will be completed.    Advance Directives:      Code Status: DNR-CCA   Patient's Primary Decision Maker is: Legal Next of Kin    Primary Decision Maker: Matilda Tyson - Spouse - [phone]    Discharge Planning:    Patient lives with: (P) Spouse/Significant Other Type of Home: (P) House (7 steps)  Primary Care Giver: Self  Patient Support Systems include: Spouse/Significant Other, Children   Current Financial resources: (P) Medicare  Current community resources: (P) Other (Comment) (HD on Tuesdays and Saturdays)  Current services prior to admission: (P) None            Current DME:              Type of Home Care services:  (P) None    ADLS  Prior functional level: (P) Cooking, Housework, Assistance with the following:  Current functional level: (P) Assistance with the following:, Cooking, Housework    PT AM-PAC:   /24  OT AM-PAC:   /24    Family can provide assistance at DC: (P) Yes  Would

## 2025-03-20 NOTE — PROGRESS NOTES
Treatment time: 3.5 hrs    Net UF: 2000 ml     Pre weight: 52.6 kg  Post weight: 50.6 kg     Access used: LFA AVG  Access function:  tolerated well,  BFR 350ml/min     Medications or blood products given: none     Regular outpatient schedule: TTS     Summary of response to treatment: Pt tolerated well. Pt remained stable throughout entire treatment and upon exiting the hemodialysis suite.      Copy of dialysis treatment record placed in chart, to be scanned into EMR.

## 2025-03-20 NOTE — PROGRESS NOTES
Pt Ao x2, disoriented to time and situation. Can be oriented at times. VSS. Pt denies pain when asked. Pt to get medication upon return from dalysis. Morning assessment was completed prior to dialysis. Pt turns self in bed but will be reminded if needed. Pt has no questions or concerns. Standard safety measures in place.     0816 to dialysis.   1220 return. VSS. Denies pain when asked. Morning medications given, tolerated well.     Electronically signed by Maricruz Martino RN on 3/20/2025 at 11:36 AM

## 2025-03-20 NOTE — PROGRESS NOTES
Department of Internal Medicine  Nephrology Progress Note      REASON FOR CONSULTATION:  Emergent dialysis.     HISTORY OF PRESENTING ILLNESS:  A 79-year-old  male with past medical history significant for hypertension; diabetes mellitus type 2; PLA2R membranous nephropathy; end-stage renal disease, on hemodialysis Tuesday and Saturday, came to ER complaining of shortness of breath and dyspnea on exertion.  The patient went to his dialysis unit on Saturday, but could not get the treatment because of clotted AV fistula.  He came to ER complaining of shortness of breath and dyspnea on exertion.  Admitted for further care and management.  Routine labs showed potassium was 6.7, bicarb of 13, , creatinine 13.7, and lactic acid of 5.6.  Case discussed with the emergency room team for emergent treatment of hyperkalemia and severe metabolic acidosis.  Arrangements being made for the patient to be admitted in ICU in need of temporary dialysis catheter and emergent dialysis treatment.    S/p thrombectomy of V graft .   Used graft on HD, no issue .   Labs reviewed .    REVIEW OF SYSTEMS:  Tired /sleepy .     Family at bed side     Physical Exam:    VITALS:  BP (!) 149/87   Pulse 75   Temp 97.5 °F (36.4 °C) (Axillary)   Resp 16   Ht 1.6 m (5' 3\")   Wt 50.6 kg (111 lb 8.8 oz)   SpO2 97%   BMI 19.76 kg/m²   24HR INTAKE/OUTPUT:    Intake/Output Summary (Last 24 hours) at 3/20/2025 1233  Last data filed at 3/20/2025 0623  Gross per 24 hour   Intake 370 ml   Output --   Net 370 ml       Constitutional: Looks comfortable   Respiratory:  CTA  Gastrointestinal: No  tenderness.  Normal Bowel Sounds  Cardiovascular:  S1, S2 RRR   Edema:  Lower Extremity has no edema    DATA:    CBC:  Lab Results   Component Value Date/Time    WBC 7.2 03/20/2025 05:45 AM    RBC 3.86 03/20/2025 05:45 AM    HGB 11.9 03/20/2025 05:45 AM    HCT 36.5 03/20/2025 05:45 AM    MCV 94.6 03/20/2025 05:45 AM    MCH 30.8 03/20/2025 05:45  AM    MCHC 32.6 03/20/2025 05:45 AM    RDW 20.1 03/20/2025 05:45 AM    PLT 93 03/20/2025 05:45 AM    MPV 10.4 03/20/2025 05:45 AM     CMP:  Lab Results   Component Value Date/Time     03/20/2025 05:45 AM    K 6.2 03/20/2025 05:45 AM    K 6.2 03/20/2025 05:45 AM    CL 97 03/20/2025 05:45 AM    CO2 14 03/20/2025 05:45 AM     03/20/2025 05:45 AM    CREATININE 9.9 03/20/2025 05:45 AM    GFRAA >60 06/04/2014 08:02 AM    GFRAA >60 01/11/2013 09:43 AM    AGRATIO 1.3 03/17/2025 10:01 AM    LABGLOM 5 03/20/2025 05:45 AM    GLUCOSE 151 03/20/2025 05:45 AM    CALCIUM 9.3 03/20/2025 05:45 AM    BILITOT 1.7 03/20/2025 05:45 AM    ALKPHOS 75 03/20/2025 05:45 AM     03/20/2025 05:45 AM     03/20/2025 05:45 AM      Hepatic Function Panel:   Lab Results   Component Value Date/Time    ALKPHOS 75 03/20/2025 05:45 AM     03/20/2025 05:45 AM     03/20/2025 05:45 AM    BILITOT 1.7 03/20/2025 05:45 AM    BILIDIR 1.1 03/20/2025 05:45 AM    IBILI 0.6 03/20/2025 05:45 AM      Phosphorus:   Lab Results   Component Value Date/Time    PHOS 12.6 03/20/2025 05:45 AM       ASSESSMENT:  Principal Problem:    Hyperkalemia  Active Problems:    ESRD needing dialysis (HCC)  Resolved Problems:    * No resolved hospital problems. *      PLAN:  1-ESRD HD 2 /wk   Suggest to increase 3w/k .  HD as ordered , no change in TW.   Recheck labs in am.     2- Clotted AV graft   S/p Thrombectomy by VS  Appreciate assistance .     3- HTN  Controlled .    4- Anemia CKD  Hb noted.  Off epo .    5-Osteodystrophy   Labs reviewed .           Purvi Faust MD, FACP

## 2025-03-20 NOTE — PROGRESS NOTES
R femoral line removed, pressure applied for 10 minutes. No complications.     Electronically signed by Maricruz Martino RN on 3/20/2025 at 4:11 PM

## 2025-03-20 NOTE — PLAN OF CARE
[facility] high risk fall precautions, as indicated  3. Provide frequent short contacts to provide reality reorientation, refocusing and direction  4. Decrease environmental stimuli, including noise as appropriate  5. Monitor and intervene to maintain adequate nutrition, hydration, elimination, sleep and activity  6. If unable to ensure safety without constant attention obtain sitter and review sitter guidelines with assigned personnel  7. Initiate Psychosocial CNS and Spiritual Care consult, as indicated  3/20/2025 1134 by Maricruz Martino, RN  Outcome: Progressing  3/20/2025 0258 by Nay Yoder, RN  Outcome: Progressing     Problem: Pain  Goal: Verbalizes/displays adequate comfort level or baseline comfort level  3/20/2025 1134 by Maricruz Martino, RN  Outcome: Progressing  3/20/2025 0258 by Nay Yoder, RN  Outcome: Progressing     Problem: Skin/Tissue Integrity - Adult  Goal: Incisions, wounds, or drain sites healing without S/S of infection  3/20/2025 1134 by Maricruz Martino, RN  Outcome: Progressing  3/20/2025 0258 by Nay Yoder, RN  Outcome: Progressing

## 2025-03-20 NOTE — PROGRESS NOTES
[facility]  Progress Note    3/20/2025 9:18 AM    Chief Complaint: Fatigue     Reason for Consult: Fistula Evaluation    POD #1 Left Arm AVG Thrombectomy, Fistulagram and Left Brachial Venoplasty with Dr. Sher    Subjective: Pt seen resting in bed, alert to self, denies pain to Left Arm, reports sensation to be intact to Left Hand    Vital Signs:  Vitals:    03/19/25 2130 03/19/25 2349 03/20/25 0427 03/20/25 0839   BP:  (!) 146/81 (!) 159/86    Pulse:  62 73    Resp:   20 18   Temp:  97.5 °F (36.4 °C) 97.8 °F (36.6 °C)    TempSrc:  Oral Oral    SpO2: 97% 100% 100% 100%   Weight:   52.6 kg (115 lb 15.4 oz)    Height:           I/O:    Intake/Output Summary (Last 24 hours) at 3/20/2025 0918  Last data filed at 3/20/2025 0623  Gross per 24 hour   Intake 370 ml   Output --   Net 370 ml       Physical Exam:   General: no apparent distress, alert and oriented to person, afebrile  Chest/Lungs: non labored breathing no tachypnea, retractions or cyanosis  Cardiac: Heart regular rate and rhythm  Extremities: normal strength, tone, and muscle mass, no deformities, no significant edema to BLE  -LUE - Left Hand warm to touch, motor/sensory intact, thrill and bruit noted to AVG, incision intact with surgical glue, mild swelling noted beneath incision - area of swelling non pulsatile    Labs:   Lab Results   Component Value Date/Time     03/20/2025 05:45 AM    K 6.2 03/20/2025 05:45 AM    K 6.2 03/20/2025 05:45 AM    CL 97 03/20/2025 05:45 AM    CO2 14 03/20/2025 05:45 AM     03/20/2025 05:45 AM    CREATININE 9.9 03/20/2025 05:45 AM    GFRAA >60 06/04/2014 08:02 AM    GFRAA >60 01/11/2013 09:43 AM    LABGLOM 5 03/20/2025 05:45 AM    GLUCOSE 151 03/20/2025 05:45 AM    PHOS 12.6 03/20/2025 05:45 AM    CALCIUM 9.3 03/20/2025 05:45 AM     Lab Results   Component Value Date/Time    WBC 7.2 03/20/2025 05:45 AM    RBC 3.86 03/20/2025 05:45 AM    HGB 11.9 03/20/2025 05:45 AM    HCT 36.5  acute complaints or concerns    No further vascular surgery intervention  Continue to access AV graft over prior exercise.  Surgical site marked do not accessed due to risk of bleeding/hematoma  Further per nephrology  Vascular surgery will sign off.  Patient can follow-up in clinic in 2 weeks for wound check.  Further management of AV graft per access center        Magdy Sher MD, RPVI  [facility]  3/20/2025  11:35 AM

## 2025-03-20 NOTE — PROGRESS NOTES
Progress Note    Patient Elroy Tyson  MRN: [id number]  YOB: 1945 Age: 79 y.o. Sex: male  Room: [place] [address]       Admitting Physician: Ney SOTELO DO   Date of Admission: 3/17/2025  8:33 AM   Primary Care Physician: Kellee Altamirano MD     Subjective:  Elroy Tyson was seen and examined. We are following for abnormal liver enzymes.  -- Patient denies any GI symptoms.  Undergoing dialysis.  Appears somewhat lethargic.      Objective:  Vital Signs:   Vitals:    03/20/25 1125   BP: (!) 146/82   Pulse: 65   Resp: 18   Temp: 98.2 °F (36.8 °C)   SpO2:          Physical Exam:  Constitutional: Lethargic, abdomen is soft.  Intake/Output:    Intake/Output Summary (Last 24 hours) at 3/20/2025 1219  Last data filed at 3/20/2025 0623  Gross per 24 hour   Intake 370 ml   Output --   Net 370 ml        Current Medications:  Current Facility-Administered Medications   Medication Dose Route Frequency Provider Last Rate Last Admin    glucose chewable tablet 16 g  4 tablet Oral PRN Magdy Sher MD        dextrose bolus 10% 125 mL  125 mL IntraVENous PRN Magdy Sher MD        Or    dextrose bolus 10% 250 mL  250 mL IntraVENous PRN Magdy Sher MD        glucagon injection 1 mg  1 mg SubCUTAneous PRN Magdy Sher MD        dextrose 10 % infusion   IntraVENous Continuous PRN Magdy Sher MD        sodium chloride flush 0.9 % injection 5-40 mL  5-40 mL IntraVENous 2 times per day Magdy Sher MD   10 mL at 03/19/25 2026    sodium chloride flush 0.9 % injection 5-40 mL  5-40 mL IntraVENous PRN Magdy Sher MD        0.9 % sodium chloride infusion   IntraVENous PRN Magdy Sher MD        ondansetron (ZOFRAN-ODT) disintegrating tablet 4 mg  4 mg Oral Q8H PRN Magdy Sher MD        Or    ondansetron (ZOFRAN) injection 4 mg  4 mg IntraVENous Q6H PRN Magdy Sher MD        polyethylene glycol (GLYCOLAX) packet 17 g  17 g Oral Daily PRN Magdy Sher MD        acetaminophen (TYLENOL) tablet 650 mg        Assessment:  Hospital Problems           Last Modified POA    * (Principal) Hyperkalemia 3/17/2025 Yes    ESRD needing dialysis (HCC) 3/17/2025 Yes       Elroy Tyson is a 79 y.o. male with a PMH of end-stage renal disease on dialysis CAD hypertension hyperlipidemia who presented on 3/17/2025 with fatigue and hyperkalemia.      Elevated LFTs  -Continues to trend down. Reassuring. No abdominal pain.  -Denies personal or family history of liver disease.  Denies alcohol or drug use or new medications or herbal supplements.  -RUQ ultrasound 3/17 shown cholelithiasis without evidence of cholecystitis.  Mild hepatic steatosis.  -hepatitis labs are negative.   -Liver serology labs are ordered.   -Monitor and trend LFTs         End-stage renal disease on dialysis  -nephrology following      Hyperkalemia  -trended down from 6.7 to 4  -nephrology and primary following      Brachial-Brachial AVG   -LUE AVG Thrombectomy and Fistulagram procedure today with vascular .   -Eliquis on hold  -vascular following     RECOMMENDATIONS:    Liver serology labs needs to be collected   Monitor and trend LFTs   Overall steady improvement in liver test, suspect abnormality related to hepatic injection or decreased perfusion.          Marvin Ryan MD    [facility]    [phone]. Also available via Perfect Serve    Please note that some or all of this record was generated using voice recognition software. If there are any questions about the content of this document, please contact the author as some errors in translation may have occurred.

## 2025-03-20 NOTE — PROGRESS NOTES
V2.0    [facility] [place] Progress Note      Name:  Elroy Tyson /Age/Sex: 1945  (79 y.o. male)   MRN & CSN:  [id number] & [id number] Encounter Date/Time: 3/20/2025 11:26 AM EDT   Location:  [id number] PCP: Kellee Altamirano MD     Attending:Nolan Foster MD       Hospital Day: 4    Assessment and Recommendations   Elroy Tyson is a 79 y.o. male with pmh of HLD, HTN, CAD, end-stage renal disease on dialysis who presents with Hyperkalemia      Patient was evaluated this morning.  Nursing staff are at the bedside giving overnight events  Vascular on board, patient is status post left upper extremity arteriovenous fistula thrombectomy with fistulogram and angioplasty.    Labs obtained this morning showing sodium 142, potassium 6.2, , creatinine 9.9, GFR 5.  Nephrology is on board and patient is patient is to undergo dialysis today.  Will repeat labs after the dialysis session  GI on board and following regarding patient's elevated liver enzymes.  They are recommending obtaining liver serology    Plan:   End-stage renal disease on dialysis  Febrile temp HD placed on 3/16/2025 for dialysis, thrombectomy of fistula occlusion 3/19/2025  Avoid nephrotoxic agents, avoid hypotension  Following and monitoring kidney function  Nephrology on board for dialysis      2.  Hyperkalemia  Most likely in setting of end-stage renal disease  Continuous cardiac monitor  Will monitor calcium and treatment accordingly      3.  Fistula occlusion  Patient status post left upper extremity arteriovenous fistula thrombectomy with fistulogram and angioplasty 3/20/2025    4.  Elevated liver enzymes  Imaging shows hepatic steatosis  Hepatitis panel negative  GI on board and following obtaining liver serology        Diet ADULT DIET; Regular; Less than 60 gm   DVT Prophylaxis [] Lovenox, []  Heparin, [] SCDs, [x] Ambulation,  [x] Eliquis, [] Xarelto  [] Coumadin   Code Status DNR-CCA   Disposition From: Home  Expected  Negative sonographic Dey sign.  Common bile duct is within normal limits measuring 5 mm. RIGHT KIDNEY: The right kidney is grossly unremarkable without evidence of hydronephrosis. Simple appearing cyst at the lower pole measuring 1.7 cm. PANCREAS:  Visualized portions of the pancreas are unremarkable. OTHER: No evidence of right upper quadrant ascites.     1. Cholelithiasis without evidence of cholecystitis. 2. Mild hepatic steatosis.     XR CHEST 1 VIEW  Result Date: 3/17/2025  EXAMINATION: ONE XRAY VIEW OF THE CHEST 3/17/2025 10:08 am COMPARISON: None. HISTORY: ORDERING SYSTEM PROVIDED HISTORY: fatigue TECHNOLOGIST PROVIDED HISTORY: Reason for exam:->fatigue FINDINGS: Postoperative changes of the mediastinum.Prominent perihilar interstitial markings.Small left pleural effusion.  No pneumothorax.The cardiomediastinal silhouette is normal.No acute osseous abnormality.     Mild pulmonary edema with a small right pleural effusion.  Infection not excluded in the appropriate clinical setting.       CBC:   Recent Labs     03/18/25  0610 03/19/25  0536 03/20/25  0545   WBC 11.5* 8.4 7.2   HGB 10.5* 11.6* 11.9*   * 97* 93*     BMP:    Recent Labs     03/18/25  0610 03/19/25  0536 03/20/25  0545    140 142   K 4.9  4.9 5.1 6.2*  6.2*   CL 95* 97* 97*   CO2 18* 18* 14*   BUN 92* 81* 102*   CREATININE 10.0* 8.7* 9.9*   GLUCOSE 118* 131* 151*     Hepatic:   Recent Labs     03/18/25  0610 03/19/25  0536 03/20/25  0545   * 327* 151*   * 589* 398*   BILITOT 1.6* 1.8* 1.7*   ALKPHOS 82 85 75     Lipids:   Lab Results   Component Value Date/Time    CHOL 117 06/04/2014 08:02 AM    HDL 50 06/04/2014 08:02 AM    HDL 42 05/25/2012 09:34 AM    TRIG 81 06/04/2014 08:02 AM     Hemoglobin A1C:   Lab Results   Component Value Date/Time    LABA1C 6.8 06/04/2014 08:02 AM     TSH:   Lab Results   Component Value Date/Time    TSH 1.17 06/04/2014 08:02 AM    TSH 0.81 09/13/2013 10:10 AM     Troponin: No results

## 2025-03-21 LAB
ACANTHOCYTES BLD QL SMEAR: ABNORMAL
ALBUMIN SERPL-MCNC: 3.5 G/DL (ref 3.4–5)
ALP SERPL-CCNC: 73 U/L (ref 40–129)
ALT SERPL-CCNC: 195 U/L (ref 10–40)
ANA SER QL IA: NEGATIVE
ANION GAP SERPL CALCULATED.3IONS-SCNC: 24 MMOL/L (ref 3–16)
ANISOCYTOSIS BLD QL SMEAR: ABNORMAL
AST SERPL-CCNC: 89 U/L (ref 15–37)
BACTERIA BLD CULT ORG #2: NORMAL
BACTERIA BLD CULT: NORMAL
BACTERIA UR CULT: ABNORMAL
BACTERIA UR CULT: ABNORMAL
BASOPHILS # BLD: 0 K/UL (ref 0–0.2)
BASOPHILS NFR BLD: 0 %
BILIRUB DIRECT SERPL-MCNC: 0.9 MG/DL (ref 0–0.3)
BILIRUB INDIRECT SERPL-MCNC: 0.5 MG/DL (ref 0–1)
BILIRUB SERPL-MCNC: 1.4 MG/DL (ref 0–1)
BUN SERPL-MCNC: 81 MG/DL (ref 7–20)
BURR CELLS BLD QL SMEAR: ABNORMAL
CALCIUM SERPL-MCNC: 8.7 MG/DL (ref 8.3–10.6)
CHLORIDE SERPL-SCNC: 95 MMOL/L (ref 99–110)
CO2 SERPL-SCNC: 20 MMOL/L (ref 21–32)
CREAT SERPL-MCNC: 7.8 MG/DL (ref 0.8–1.3)
DEPRECATED RDW RBC AUTO: 20.8 % (ref 12.4–15.4)
EOSINOPHIL # BLD: 0 K/UL (ref 0–0.6)
EOSINOPHIL NFR BLD: 0 %
GFR SERPLBLD CREATININE-BSD FMLA CKD-EPI: 7 ML/MIN/{1.73_M2}
GLUCOSE BLD-MCNC: 126 MG/DL (ref 70–99)
GLUCOSE BLD-MCNC: 132 MG/DL (ref 70–99)
GLUCOSE BLD-MCNC: 134 MG/DL (ref 70–99)
GLUCOSE BLD-MCNC: 157 MG/DL (ref 70–99)
GLUCOSE SERPL-MCNC: 116 MG/DL (ref 70–99)
HCT VFR BLD AUTO: 32.7 % (ref 40.5–52.5)
HGB BLD-MCNC: 10.7 G/DL (ref 13.5–17.5)
HYPOCHROMIA BLD QL SMEAR: ABNORMAL
LYMPHOCYTES # BLD: 0.8 K/UL (ref 1–5.1)
LYMPHOCYTES NFR BLD: 8 %
MACROCYTES BLD QL SMEAR: ABNORMAL
MCH RBC QN AUTO: 30.5 PG (ref 26–34)
MCHC RBC AUTO-ENTMCNC: 32.8 G/DL (ref 31–36)
MCV RBC AUTO: 92.9 FL (ref 80–100)
MONOCYTES # BLD: 0.6 K/UL (ref 0–1.3)
MONOCYTES NFR BLD: 6 %
NEUTROPHILS # BLD: 9.1 K/UL (ref 1.7–7.7)
NEUTROPHILS NFR BLD: 86 %
NEUTS VAC BLD QL SMEAR: PRESENT
NRBC BLD-RTO: 6 /100 WBC
ORGANISM: ABNORMAL
OVALOCYTES BLD QL SMEAR: ABNORMAL
PERFORMED ON: ABNORMAL
PHOSPHATE SERPL-MCNC: 10.2 MG/DL (ref 2.5–4.9)
PLATELET # BLD AUTO: 79 K/UL (ref 135–450)
PLATELET BLD QL SMEAR: ADEQUATE
PMV BLD AUTO: 10.4 FL (ref 5–10.5)
POIKILOCYTOSIS BLD QL SMEAR: ABNORMAL
POLYCHROMASIA BLD QL SMEAR: ABNORMAL
POTASSIUM SERPL-SCNC: 4.9 MMOL/L (ref 3.5–5.1)
POTASSIUM SERPL-SCNC: 4.9 MMOL/L (ref 3.5–5.1)
PROT SERPL-MCNC: 5.8 G/DL (ref 6.4–8.2)
RBC # BLD AUTO: 3.52 M/UL (ref 4.2–5.9)
SCHISTOCYTES BLD QL SMEAR: ABNORMAL
SLIDE REVIEW: ABNORMAL
SODIUM SERPL-SCNC: 139 MMOL/L (ref 136–145)
TARGETS BLD QL SMEAR: ABNORMAL
WBC # BLD AUTO: 10.6 K/UL (ref 4–11)

## 2025-03-21 PROCEDURE — 36415 COLL VENOUS BLD VENIPUNCTURE: CPT

## 2025-03-21 PROCEDURE — 6370000000 HC RX 637 (ALT 250 FOR IP): Performed by: SURGERY

## 2025-03-21 PROCEDURE — 6370000000 HC RX 637 (ALT 250 FOR IP): Performed by: NURSE PRACTITIONER

## 2025-03-21 PROCEDURE — 83516 IMMUNOASSAY NONANTIBODY: CPT

## 2025-03-21 PROCEDURE — 1200000000 HC SEMI PRIVATE

## 2025-03-21 PROCEDURE — 80076 HEPATIC FUNCTION PANEL: CPT

## 2025-03-21 PROCEDURE — 80069 RENAL FUNCTION PANEL: CPT

## 2025-03-21 PROCEDURE — 81256 HFE GENE: CPT

## 2025-03-21 PROCEDURE — 86038 ANTINUCLEAR ANTIBODIES: CPT

## 2025-03-21 PROCEDURE — 85025 COMPLETE CBC W/AUTO DIFF WBC: CPT

## 2025-03-21 PROCEDURE — 86015 ACTIN ANTIBODY EACH: CPT

## 2025-03-21 PROCEDURE — 82105 ALPHA-FETOPROTEIN SERUM: CPT

## 2025-03-21 PROCEDURE — 82103 ALPHA-1-ANTITRYPSIN TOTAL: CPT

## 2025-03-21 PROCEDURE — 2500000003 HC RX 250 WO HCPCS: Performed by: SURGERY

## 2025-03-21 PROCEDURE — 94760 N-INVAS EAR/PLS OXIMETRY 1: CPT

## 2025-03-21 PROCEDURE — 92610 EVALUATE SWALLOWING FUNCTION: CPT

## 2025-03-21 PROCEDURE — 82390 ASSAY OF CERULOPLASMIN: CPT

## 2025-03-21 RX ORDER — NIFEDIPINE 60 MG/1
60 TABLET, EXTENDED RELEASE ORAL 2 TIMES DAILY
Qty: 30 TABLET | Refills: 1 | Status: SHIPPED | OUTPATIENT
Start: 2025-03-21

## 2025-03-21 RX ADMIN — APIXABAN 2.5 MG: 2.5 TABLET, FILM COATED ORAL at 20:03

## 2025-03-21 RX ADMIN — ASPIRIN 81 MG: 81 TABLET, CHEWABLE ORAL at 09:05

## 2025-03-21 RX ADMIN — DORZOLAMIDE HYDROCHLORIDE AND TIMOLOL MALEATE 1 DROP: 20; 5 SOLUTION/ DROPS OPHTHALMIC at 09:06

## 2025-03-21 RX ADMIN — SODIUM CHLORIDE, PRESERVATIVE FREE 10 ML: 5 INJECTION INTRAVENOUS at 20:03

## 2025-03-21 RX ADMIN — APIXABAN 2.5 MG: 2.5 TABLET, FILM COATED ORAL at 09:05

## 2025-03-21 RX ADMIN — SODIUM CHLORIDE, PRESERVATIVE FREE 10 ML: 5 INJECTION INTRAVENOUS at 09:07

## 2025-03-21 RX ADMIN — DORZOLAMIDE HYDROCHLORIDE AND TIMOLOL MALEATE 1 DROP: 20; 5 SOLUTION/ DROPS OPHTHALMIC at 20:03

## 2025-03-21 NOTE — CARE COORDINATION
3/20 PLAN: Return to home with wife. HD at [facility] on Tues, Sat. Drives self. (Needs to increase HD to 3 d/wk per Nephro-clinic notified). Follow for d/c needs. Fax HD notes to [facility] at d/c. Electronically signed by Tomasa Leo RN on 3/21/2025 at 3:34 PM

## 2025-03-21 NOTE — PROGRESS NOTES
breath ever since Tuesday but denies any chest pain or palpitations.  Has not had any recent illness or fevers or chills.  Denies any increased peripheral edema.  Has not had any issues with his medications and is taking everything as prescribed.  Denies any abdominal pain.\"   Consults noted: Pulmonology, GI, Nephrology, Vascular  3/19/2025: L AVG thrombectomy    Most Recent Imaging:  3/18/2025 CXR: IMPRESSION: Stable appearance of the chest.  3/17/2025 US Gallbladder: IMPRESSION: 1. Cholelithiasis without evidence of cholecystitis. 2. Mild hepatic steatosis.    Current Diet Level (prior to evaluation): Regular texture, Thin liquids      Respiratory Status: Room Air     SUBJECTIVE     General: Accepted and tolerated evaluation at bedside    Comments regarding referral/diet/dysphagia:   Patient: denies dysphagia concerns; reports lethargy following dialysis session with need for rest prior to eating; patient does report preference for denture placement with PO - dentures at bedside wrapped in paper towels with report for dentures not in place with PO 3/202/2025  Family: denies concerns for dysphagia  Staff: reports dysphagia concerns resolving as mental status is improving; reported pocketing with lethargy 3/20/2025    ASSESSMENT / IMPRESSIONS     Dysphagia Impressions/Diagnosis: Oropharyngeal Dysphagia   Patient alert and cooperative; oriented to self, place, month, year; verbally responsive and follow dx.  Pre-feeding: dentures at bedside cleaned and placed; adequate oral motor function; filmy lingual surface; gag intact; adequate vocal quality and volitional cough; potential for delayed volitional swallow.  Clinically, patient appears to present with oropharyngeal dysphagia features characterized by prolonged but adequate mastication and lingual manipulation r/t generalized weakness with potential for delayed swallow initiation and decreased laryngeal elevation. Prolonged but adequate oral prep with PO trials this  self/place/month/year, alert, cooperative, verbally responsive, follows one step commands    Consistencies Presented: Regular texture, Soft and Bite-sized texture, Puree texture, Thin liquids  via straw  PO Trial Feeding Assistance: Self-feed    Patient Positioning: Fully upright, In bed    Dentition: Upper Denture, Lower Denture    Baseline Vocal Quality: adequate    Volitional Cough: Strong    Volitional Swallow:  Delayed    Oral Mechanism Exam:    Oral Hygiene: Moist, Filmy  Mandible: WFL  Labial: WFL  Lingual: WFL  Palate: WFL  Gag: Intact    Oral Phase:  WFL, but may need extra time     Pharyngeal Phase: No apparent overt clinical signs/symptoms  but potential for  delayed swallow initiation, reduced laryngeal ROM    Pt reporting s/s of concern for Esophageal problems:   None reported      If patient discharges prior to next visit, this note will serve as discharge.     Timed Code Minutes: 0  Total Treatment Minutes:  31    Electronically signed by:    Mehreen Culver MA, CCC-SLP, #[id number]  Speech-Language Pathologist  Portable phone: [phone]   03/21/25  10:22 AM

## 2025-03-21 NOTE — PLAN OF CARE
Problem: ABCDS Injury Assessment  Goal: Absence of physical injury  3/20/2025 2347 by Elaina Mohan RN  Outcome: Progressing  3/20/2025 1134 by Maricruz Martino RN  Outcome: Progressing     Problem: Skin/Tissue Integrity  Goal: Skin integrity remains intact  Description: 1.  Monitor for areas of redness and/or skin breakdown  2.  Assess vascular access sites hourly  3.  Every 4-6 hours minimum:  Change oxygen saturation probe site  4.  Every 4-6 hours:  If on nasal continuous positive airway pressure, respiratory therapy assess nares and determine need for appliance change or resting period  3/20/2025 2347 by Elaina Mohan RN  Outcome: Progressing  3/20/2025 1134 by Maricruz Martino RN  Outcome: Progressing  Flowsheets (Taken 3/20/2025 1133)  Skin Integrity Remains Intact:   Assess vascular access sites hourly   Monitor for areas of redness and/or skin breakdown     Problem: Safety - Adult  Goal: Free from fall injury  3/20/2025 2347 by Elaina Mohan RN  Outcome: Progressing  3/20/2025 1134 by Maricruz Martino RN  Outcome: Progressing     Problem: Neurosensory - Adult  Goal: Achieves stable or improved neurological status  3/20/2025 2347 by Elaina Mohan RN  Outcome: Progressing  3/20/2025 1134 by Maricruz Martino RN  Outcome: Progressing  Goal: Achieves maximal functionality and self care  3/20/2025 2347 by Elaina Mohan RN  Outcome: Progressing  3/20/2025 1134 by Maricruz Martino RN  Outcome: Progressing     Problem: Musculoskeletal - Adult  Goal: Return mobility to safest level of function  3/20/2025 2347 by Elaina Mohan RN  Outcome: Progressing  3/20/2025 1134 by Maricruz Martino RN  Outcome: Progressing  Goal: Return ADL status to a safe level of function  3/20/2025 2347 by Elaina Mohan RN  Outcome: Progressing  3/20/2025 1134 by Maricruz Martino RN  Outcome: Progressing     Problem: Gastrointestinal - Adult  Goal: Maintains adequate nutritional intake  3/20/2025 2347 by Elaina Mohan RN  Outcome:

## 2025-03-21 NOTE — PROGRESS NOTES
Disposition From: Home  Expected Disposition: Home  Estimated Date of Discharge: 1-2 days  Patient requires continued admission due to clinical improvement   Surrogate Decision Maker/ POA Self     Personally reviewed Lab Studies and Imaging         Medical Decision Making:  The following items were considered in medical decision making:  Discussion of patient care with other providers  Reviewed clinical lab tests  Reviewed radiology tests  Reviewed other diagnostic tests/interventions  Independent review of radiologic images  Microbiology cultures and other micro tests reviewed      Subjective:     Chief Complaint: Hyperkalemia    Elroy Tyson is a 79 y.o. male who presents with hyperkalemia      Review of Systems:      Pertinent positives and negatives discussed in HPI    Objective:     Intake/Output Summary (Last 24 hours) at 3/21/2025 1104  Last data filed at 3/21/2025 0854  Gross per 24 hour   Intake 360 ml   Output --   Net 360 ml      Vitals:   Vitals:    03/21/25 0009 03/21/25 0501 03/21/25 0551 03/21/25 0822   BP: 139/85 127/70  (!) 111/98   Pulse: 65 67  68   Resp: 16 16  16   Temp: 98.8 °F (37.1 °C) 98.1 °F (36.7 °C)  97 °F (36.1 °C)   TempSrc:    Axillary   SpO2: 99% 95%  99%   Weight:   54.9 kg (121 lb 0.5 oz)    Height:             Physical Exam:      Physical Exam Performed:    BP (!) 111/98   Pulse 68   Temp 97 °F (36.1 °C) (Axillary)   Resp 16   Ht 1.6 m (5' 3\")   Wt 54.9 kg (121 lb 0.5 oz)   SpO2 99%   BMI 21.44 kg/m²     General appearance: No apparent distress, cooperative, chronically ill-appearing.  HEENT: Pupils equal, round, and reactive to light.   Neck: Supple, with full range of motion. Trachea midline.  Respiratory:  Normal respiratory effort.   Cardiovascular: Regular rate and rhythm   Abdomen: Soft, non-tender, non-distended   Musculoskeletal: No edema bilaterally.  Full range of motion without deformity.  Skin: Skin color, texture, turgor normal.  No rashes or  multiple shadowing stones.  No significant wall thickening.  Negative sonographic Dey sign.  Common bile duct is within normal limits measuring 5 mm. RIGHT KIDNEY: The right kidney is grossly unremarkable without evidence of hydronephrosis. Simple appearing cyst at the lower pole measuring 1.7 cm. PANCREAS:  Visualized portions of the pancreas are unremarkable. OTHER: No evidence of right upper quadrant ascites.     1. Cholelithiasis without evidence of cholecystitis. 2. Mild hepatic steatosis.     XR CHEST 1 VIEW  Result Date: 3/17/2025  EXAMINATION: ONE XRAY VIEW OF THE CHEST 3/17/2025 10:08 am COMPARISON: None. HISTORY: ORDERING SYSTEM PROVIDED HISTORY: fatigue TECHNOLOGIST PROVIDED HISTORY: Reason for exam:->fatigue FINDINGS: Postoperative changes of the mediastinum.Prominent perihilar interstitial markings.Small left pleural effusion.  No pneumothorax.The cardiomediastinal silhouette is normal.No acute osseous abnormality.     Mild pulmonary edema with a small right pleural effusion.  Infection not excluded in the appropriate clinical setting.       CBC:   Recent Labs     03/19/25  0536 03/20/25  0545 03/21/25  0551   WBC 8.4 7.2 10.6   HGB 11.6* 11.9* 10.7*   PLT 97* 93* 79*     BMP:    Recent Labs     03/20/25  0545 03/20/25  1311 03/21/25  0551    140 139   K 6.2*  6.2* 4.3 4.9  4.9   CL 97* 96* 95*   CO2 14* 21 20*   * 63* 81*   CREATININE 9.9* 6.5* 7.8*   GLUCOSE 151* 118* 116*     Hepatic:   Recent Labs     03/20/25  0545 03/20/25  1311 03/21/25  0551   * 137* 89*   * 329* 195*   BILITOT 1.7* 1.8* 1.4*   ALKPHOS 75 76 73     Lipids:   Lab Results   Component Value Date/Time    CHOL 117 06/04/2014 08:02 AM    HDL 50 06/04/2014 08:02 AM    HDL 42 05/25/2012 09:34 AM    TRIG 81 06/04/2014 08:02 AM     Hemoglobin A1C:   Lab Results   Component Value Date/Time    LABA1C 6.8 06/04/2014 08:02 AM     TSH:   Lab Results   Component Value Date/Time    TSH 1.17 06/04/2014 08:02 AM

## 2025-03-21 NOTE — DISCHARGE INSTR - COC
Continuity of Care Form    Patient Name: Elroy Tyson   :  1945  MRN:  [id number]    Admit date:  3/17/2025  Discharge date:  ***    Code Status Order: DNR-CCA   Advance Directives:    Date/Time Healthcare Directive Type of Healthcare Directive Copy in Chart Healthcare Agent Appointed Healthcare Agent's Name Healthcare Agent's Phone Number    [phone] Yes, patient has an advance directive for healthcare treatment  Living will  Yes, copy in chart  --  --  --             Admitting Physician:  Ney SOTELO DO  PCP: Kellee Altamirano MD    Discharging Nurse: ***  Discharging Hospital Unit/Room#: O6X-4430/4134-01  Discharging Unit Phone Number: ***    Emergency Contact:   Extended Emergency Contact Information  Primary Emergency Contact: Matilda Tyson  Address: [address] [place] [address]  Home Phone: [phone]  Relation: Spouse    Past Surgical History:  Past Surgical History:   Procedure Laterality Date    COLONOSCOPY  2012    Dr. Dat Brewster    CORONARY ARTERY BYPASS GRAFT  [address] [place] [address] - Sary Chapman and Allan - single bypass (LIMA)    VASCULAR SURGERY Left 3/19/2025    LEFT UPPER EXTREMITY ARTERIOVENOUS FISTULA THROMBECTOMY WITH FISTULAGRAM AND  ANGIOPLASTY performed by Magdy Sher MD at [facility] OR       Immunization History:   Immunization History   Administered Date(s) Administered    COVID-19, J&J, (age 18y+), IM, 0.5 mL 2021, 2021    COVID-19, MODERNA Bivalent, (age 12y+), IM, 50 mcg/0.5 mL 2022, 2023    Influenza 2013    Influenza, FLUZONE High Dose, (age 65 y+), IM, Trivalent PF, 0.5mL 10/03/2014    Influenza, Intradermal, Preservative free 2012    Pneumococcal, PPSV23, PNEUMOVAX 23, (age 2y+), SC/IM, 0.5mL 2011    TDaP, ADACEL (age 10y-64y), BOOSTRIX (age 10y+), IM, 0.5mL 2012       Active Problems:  Patient Active Problem List   Diagnosis Code    Diabetic    Closure Open to air;Surgical glue 25   Margins Approximated 25   Incision Assessment Dry 25   Drainage Amount None (dry) 25   Drainage Description Serosanguinous 25   Odor None 25   Prema-incision Assessment Intact 25   Number of days: 2        Elimination:  Continence:   Bowel: {YES / NO:}  Bladder: {YES / NO:}  Urinary Catheter: {Urinary Catheter:065123954}   Colostomy/Ileostomy/Ileal Conduit: {YES / NO:}       Date of Last BM: ***    Intake/Output Summary (Last 24 hours) at 3/21/2025 152  Last data filed at 3/21/2025 1447  Gross per 24 hour   Intake 460 ml   Output --   Net 460 ml     I/O last 3 completed shifts:  In: 240 [P.O.:240]  Out: -     Safety Concerns:     { IDALIA Safety Concerns:411531204}    Impairments/Disabilities:      { IDALIA Impairments/Disabilities:713950644}    Nutrition Therapy:  Current Nutrition Therapy:   { IDALIA Diet List:679642030}    Routes of Feeding: {[facility] DME Other Feedings:316285348}  Liquids: {Slp liquid thickness:27438}  Daily Fluid Restriction: {P DME Yes amt example:570649360}  Last Modified Barium Swallow with Video (Video Swallowing Test): {Done Not Done Date:}    Treatments at the Time of Hospital Discharge:   Respiratory Treatments: ***  Oxygen Therapy:  {Therapy; copd oxygen:94900}  Ventilator:    { CC Vent List:902256627}    Rehab Therapies: {THERAPEUTIC INTERVENTION:3993784167}  Weight Bearing Status/Restrictions: {[facility] Weight Bearin}  Other Medical Equipment (for information only, NOT a DME order):  {EQUIPMENT:085748867}  Other Treatments: ***    Patient's personal belongings (please select all that are sent with patient):  {[facility] DME Belongings:639549546}    RN SIGNATURE:  {Esignature:359276540}    CASE MANAGEMENT/SOCIAL WORK SECTION    Inpatient Status Date: ***    Readmission Risk Assessment Score:  [facility] RISK OF UNPLANNED READMISSION 2.0             20.7

## 2025-03-21 NOTE — PROGRESS NOTES
Pt AO x4 oriented/disoriented at times. VSS. Pt denies pain when asked. Morning medications given, pt refused renvela. Morning assessment completed. Pt turns self but will be reminded as needed. Pt has no questions or concerns. Standard safety measures in place.     Electronically signed by Maricruz Martino RN on 3/21/2025 at 10:14 AM

## 2025-03-21 NOTE — PROGRESS NOTES
Department of Internal Medicine  Nephrology Progress Note      REASON FOR CONSULTATION:  Emergent dialysis.     HISTORY OF PRESENTING ILLNESS:  A 79-year-old  male with past medical history significant for hypertension; diabetes mellitus type 2; PLA2R membranous nephropathy; end-stage renal disease, on hemodialysis Tuesday and Saturday, came to ER complaining of shortness of breath and dyspnea on exertion.  The patient went to his dialysis unit on Saturday, but could not get the treatment because of clotted AV fistula.  He came to ER complaining of shortness of breath and dyspnea on exertion.  Admitted for further care and management.  Routine labs showed potassium was 6.7, bicarb of 13, , creatinine 13.7, and lactic acid of 5.6.  Case discussed with the emergency room team for emergent treatment of hyperkalemia and severe metabolic acidosis.  Arrangements being made for the patient to be admitted in ICU in need of temporary dialysis catheter and emergent dialysis treatment.    S/p thrombectomy of V graft .   Used graft on HD, no issue .   Labs reviewed .    REVIEW OF SYSTEMS:  No CP/SOB/QUINTEROS .    Family at bed side     Physical Exam:    VITALS:  /85   Pulse 68   Temp 97.7 °F (36.5 °C) (Oral)   Resp 16   Ht 1.6 m (5' 3\")   Wt 54.9 kg (121 lb 0.5 oz)   SpO2 99%   BMI 21.44 kg/m²   24HR INTAKE/OUTPUT:    Intake/Output Summary (Last 24 hours) at 3/21/2025 1331  Last data filed at 3/21/2025 0854  Gross per 24 hour   Intake 360 ml   Output --   Net 360 ml       Constitutional: Looks comfortable   Respiratory:  CTA  Gastrointestinal: No  tenderness.  Normal Bowel Sounds  Cardiovascular:  S1, S2 RRR   Edema:  Lower Extremity has no edema    DATA:    CBC:  Lab Results   Component Value Date/Time    WBC 10.6 03/21/2025 05:51 AM    RBC 3.52 03/21/2025 05:51 AM    HGB 10.7 03/21/2025 05:51 AM    HCT 32.7 03/21/2025 05:51 AM    MCV 92.9 03/21/2025 05:51 AM    MCH 30.5 03/21/2025 05:51 AM

## 2025-03-21 NOTE — PROGRESS NOTES
Progress Note    Patient Elroy Tyson  MRN: [id number]  YOB: 1945 Age: 79 y.o. Sex: male  Room: [place] [address]       Admitting Physician: Ney SOTELO DO   Date of Admission: 3/17/2025  8:33 AM   Primary Care Physician: Kellee Altamirano MD     Subjective:  Elroy Tyson was seen and examined. We are following for abnormal liver enzymes.  -- Patient denies GI symptoms.  He denies abdominal pain.  Denies nausea or vomiting.      Objective:  Vital Signs:   Vitals:    03/21/25 1149   BP: 134/85   Pulse: 68   Resp: 16   Temp: 97.7 °F (36.5 °C)   SpO2: 99%         Physical Exam:  Constitutional: Alert and oriented x 4. No acute distress.  Abdomen soft, nontender nondistended.  Intake/Output:    Intake/Output Summary (Last 24 hours) at 3/21/2025 1211  Last data filed at 3/21/2025 0854  Gross per 24 hour   Intake 360 ml   Output --   Net 360 ml        Current Medications:  Current Facility-Administered Medications   Medication Dose Route Frequency Provider Last Rate Last Admin    glucose chewable tablet 16 g  4 tablet Oral PRN Magdy Sher MD        dextrose bolus 10% 125 mL  125 mL IntraVENous PRN Magdy Sher MD        Or    dextrose bolus 10% 250 mL  250 mL IntraVENous PRN Magdy Sher MD        glucagon injection 1 mg  1 mg SubCUTAneous PRN Magdy Sher MD        dextrose 10 % infusion   IntraVENous Continuous PRN Magdy Sher MD        sodium chloride flush 0.9 % injection 5-40 mL  5-40 mL IntraVENous 2 times per day Magdy Sher MD   10 mL at 03/21/25 0907    sodium chloride flush 0.9 % injection 5-40 mL  5-40 mL IntraVENous PRN Magdy Sher MD        0.9 % sodium chloride infusion   IntraVENous PRN Magdy Sher MD        ondansetron (ZOFRAN-ODT) disintegrating tablet 4 mg  4 mg Oral Q8H PRN Magdy Sher MD        Or    ondansetron (ZOFRAN) injection 4 mg  4 mg IntraVENous Q6H PRN Magdy Sher MD        polyethylene glycol (GLYCOLAX) packet 17 g  17 g Oral Daily PRN Christos     142 140 139   K 5.1 6.2*  6.2* 4.3 4.9  4.9          Assessment:  Hospital Problems           Last Modified POA    * (Principal) Hyperkalemia 3/17/2025 Yes    ESRD needing dialysis (HCC) 3/17/2025 Yes       Elroy Tyson is a 79 y.o. male with a PMH of end-stage renal disease on dialysis CAD hypertension hyperlipidemia who presented on 3/17/2025 with fatigue and hyperkalemia.      Elevated LFTs  -Continues to trend down. Reassuring. No abdominal pain.  -Denies personal or family history of liver disease.  Denies alcohol or drug use or new medications or herbal supplements.  -RUQ ultrasound 3/17 shown cholelithiasis without evidence of cholecystitis.  Mild hepatic steatosis.  -hepatitis labs are negative.   -Liver serology labs are ordered.   -Monitor and trend LFTs         End-stage renal disease on dialysis  -nephrology following      Hyperkalemia  -trended down from 6.7 to 4  -nephrology and primary following      Brachial-Brachial AVG   -LUE AVG Thrombectomy and Fistulagram procedure today with vascular .   -Eliquis on hold  -vascular following     RECOMMENDATIONS:    Liver serology labs pending  LFTs trending down  Overall steady improvement in liver test, suspect abnormality related to hepatic congestion or decreased perfusion.  Continue to check LFTs periodically until complete normalization.  If LFTs remain elevated then he should follow-up as an outpatient.  No further GI plans at this point  We will sign off, please call with questions          Marvin Ryan MD    [facility]    [phone]. Also available via [employer]    Please note that some or all of this record was generated using voice recognition software. If there are any questions about the content of this document, please contact the author as some errors in translation may have occurred.

## 2025-03-21 NOTE — PROGRESS NOTES
Shift assessment done. All night time medications given per MAR. Patient took all his oral medications whole in pudding, tolerated well. Surgical incision to left forearm clean, dry and intact.  All fall precautions implemented. All needs attended. Electronically signed by Elaina Mohan RN on 3/20/2025 at 11:48 PM

## 2025-03-21 NOTE — PLAN OF CARE
Problem: Chronic Conditions and Co-morbidities  Goal: Patient's chronic conditions and co-morbidity symptoms are monitored and maintained or improved  3/21/2025 1012 by Maricruz Martino RN  Outcome: Progressing  3/20/2025 2347 by Elaina Mohan RN  Outcome: Progressing     Problem: Discharge Planning  Goal: Discharge to home or other facility with appropriate resources  3/21/2025 1012 by Maricruz Martino RN  Outcome: Progressing  3/20/2025 2347 by Elaina Mohan RN  Outcome: Progressing     Problem: ABCDS Injury Assessment  Goal: Absence of physical injury  3/21/2025 1012 by Maricruz Martino RN  Outcome: Progressing  3/20/2025 2347 by Elaina Mohan RN  Outcome: Progressing     Problem: Skin/Tissue Integrity  Goal: Skin integrity remains intact  Description: 1.  Monitor for areas of redness and/or skin breakdown  2.  Assess vascular access sites hourly  3.  Every 4-6 hours minimum:  Change oxygen saturation probe site  4.  Every 4-6 hours:  If on nasal continuous positive airway pressure, respiratory therapy assess nares and determine need for appliance change or resting period  3/21/2025 1012 by Maricruz Martino RN  Outcome: Progressing  Flowsheets (Taken 3/21/2025 1011)  Skin Integrity Remains Intact:   Monitor for areas of redness and/or skin breakdown   Assess vascular access sites hourly  3/20/2025 2347 by Elaina Mohan RN  Outcome: Progressing     Problem: Safety - Adult  Goal: Free from fall injury  3/21/2025 1012 by Maricruz Martino RN  Outcome: Progressing  3/20/2025 2347 by Elaina Mohan RN  Outcome: Progressing     Problem: Neurosensory - Adult  Goal: Achieves stable or improved neurological status  3/21/2025 1012 by Maricruz Martino RN  Outcome: Progressing  3/20/2025 2347 by Elaina Mohan RN  Outcome: Progressing  Goal: Achieves maximal functionality and self care  3/21/2025 1012 by Maricruz Martino RN  Outcome: Progressing  3/20/2025 2347 by Elaina Mohan RN  Outcome: Progressing     Problem:  Musculoskeletal - Adult  Goal: Return mobility to safest level of function  3/21/2025 1012 by Maricruz Martino RN  Outcome: Progressing  3/20/2025 2347 by Elaina Mohan RN  Outcome: Progressing  Goal: Return ADL status to a safe level of function  3/21/2025 1012 by Maricruz Martino RN  Outcome: Progressing  3/20/2025 2347 by Elaina Mohan RN  Outcome: Progressing     Problem: Gastrointestinal - Adult  Goal: Maintains adequate nutritional intake  3/21/2025 1012 by Maricruz Martino RN  Outcome: Progressing  3/20/2025 2347 by Elaina Mohan RN  Outcome: Progressing     Problem: Genitourinary - Adult  Goal: Absence of urinary retention  3/21/2025 1012 by Maricruz Martino RN  Outcome: Progressing  3/20/2025 2347 by Elaina Mohan RN  Outcome: Progressing  Goal: Urinary catheter remains patent  Outcome: Progressing     Problem: Metabolic/Fluid and Electrolytes - Adult  Goal: Electrolytes maintained within normal limits  3/21/2025 1012 by Maricruz Martino RN  Outcome: Progressing  3/20/2025 2347 by Elaina Mohan RN  Outcome: Progressing  Goal: Glucose maintained within prescribed range  3/21/2025 1012 by Maricruz Martino RN  Outcome: Progressing  3/20/2025 2347 by Elaina Mohan RN  Outcome: Progressing     Problem: Respiratory - Adult  Goal: Achieves optimal ventilation and oxygenation  3/21/2025 1012 by Maricruz Martino RN  Outcome: Progressing  3/20/2025 2347 by Elaina Mohan RN  Outcome: Progressing     Problem: Confusion  Goal: Confusion, delirium, dementia, or psychosis is improved or at baseline  Description: INTERVENTIONS:  1. Assess for possible contributors to thought disturbance, including medications, impaired vision or hearing, underlying metabolic abnormalities, dehydration, psychiatric diagnoses, and notify attending LIP  2. [place] high risk fall precautions, as indicated  3. Provide frequent short contacts to provide reality reorientation, refocusing and direction  4. Decrease environmental stimuli,

## 2025-03-22 VITALS
DIASTOLIC BLOOD PRESSURE: 83 MMHG | TEMPERATURE: 97.1 F | WEIGHT: 115.3 LBS | RESPIRATION RATE: 16 BRPM | SYSTOLIC BLOOD PRESSURE: 146 MMHG | HEIGHT: 63 IN | OXYGEN SATURATION: 98 % | HEART RATE: 60 BPM | BODY MASS INDEX: 20.43 KG/M2

## 2025-03-22 LAB
ACANTHOCYTES BLD QL SMEAR: ABNORMAL
ALBUMIN SERPL-MCNC: 3.4 G/DL (ref 3.4–5)
ANION GAP SERPL CALCULATED.3IONS-SCNC: 25 MMOL/L (ref 3–16)
BASOPHILS # BLD: 0 K/UL (ref 0–0.2)
BASOPHILS NFR BLD: 0 %
BUN SERPL-MCNC: 100 MG/DL (ref 7–20)
CALCIUM SERPL-MCNC: 8.7 MG/DL (ref 8.3–10.6)
CHLORIDE SERPL-SCNC: 95 MMOL/L (ref 99–110)
CO2 SERPL-SCNC: 19 MMOL/L (ref 21–32)
CREAT SERPL-MCNC: 9.1 MG/DL (ref 0.8–1.3)
DEPRECATED RDW RBC AUTO: 20.9 % (ref 12.4–15.4)
EOSINOPHIL # BLD: 0 K/UL (ref 0–0.6)
EOSINOPHIL NFR BLD: 0 %
GFR SERPLBLD CREATININE-BSD FMLA CKD-EPI: 5 ML/MIN/{1.73_M2}
GLUCOSE BLD-MCNC: 112 MG/DL (ref 70–99)
GLUCOSE SERPL-MCNC: 118 MG/DL (ref 70–99)
HCT VFR BLD AUTO: 32.9 % (ref 40.5–52.5)
HGB BLD-MCNC: 10.7 G/DL (ref 13.5–17.5)
LYMPHOCYTES # BLD: 0.7 K/UL (ref 1–5.1)
LYMPHOCYTES NFR BLD: 5 %
MCH RBC QN AUTO: 30.2 PG (ref 26–34)
MCHC RBC AUTO-ENTMCNC: 32.4 G/DL (ref 31–36)
MCV RBC AUTO: 93.3 FL (ref 80–100)
MONOCYTES # BLD: 0.4 K/UL (ref 0–1.3)
MONOCYTES NFR BLD: 4 %
NEUTROPHILS # BLD: 9.9 K/UL (ref 1.7–7.7)
NEUTROPHILS NFR BLD: 89 %
NEUTS BAND NFR BLD MANUAL: 1 % (ref 0–7)
NEUTS VAC BLD QL SMEAR: PRESENT
NRBC BLD-RTO: 3 /100 WBC
OVALOCYTES BLD QL SMEAR: ABNORMAL
PERFORMED ON: ABNORMAL
PHOSPHATE SERPL-MCNC: 11.1 MG/DL (ref 2.5–4.9)
PLATELET # BLD AUTO: 83 K/UL (ref 135–450)
PLATELET BLD QL SMEAR: ABNORMAL
PMV BLD AUTO: 10.4 FL (ref 5–10.5)
POTASSIUM SERPL-SCNC: 5.2 MMOL/L (ref 3.5–5.1)
POTASSIUM SERPL-SCNC: 5.2 MMOL/L (ref 3.5–5.1)
RBC # BLD AUTO: 3.53 M/UL (ref 4.2–5.9)
SCHISTOCYTES BLD QL SMEAR: ABNORMAL
SLIDE REVIEW: ABNORMAL
SODIUM SERPL-SCNC: 139 MMOL/L (ref 136–145)
SPECIMEN SOURCE: NORMAL
TARGETS BLD QL SMEAR: ABNORMAL
VARIANT LYMPHS NFR BLD MANUAL: 1 % (ref 0–6)
WBC # BLD AUTO: 11 K/UL (ref 4–11)

## 2025-03-22 PROCEDURE — 6370000000 HC RX 637 (ALT 250 FOR IP): Performed by: NURSE PRACTITIONER

## 2025-03-22 PROCEDURE — 36415 COLL VENOUS BLD VENIPUNCTURE: CPT

## 2025-03-22 PROCEDURE — 6370000000 HC RX 637 (ALT 250 FOR IP): Performed by: SURGERY

## 2025-03-22 PROCEDURE — 85025 COMPLETE CBC W/AUTO DIFF WBC: CPT

## 2025-03-22 PROCEDURE — 90935 HEMODIALYSIS ONE EVALUATION: CPT

## 2025-03-22 PROCEDURE — 80069 RENAL FUNCTION PANEL: CPT

## 2025-03-22 PROCEDURE — 2500000003 HC RX 250 WO HCPCS: Performed by: SURGERY

## 2025-03-22 PROCEDURE — 94760 N-INVAS EAR/PLS OXIMETRY 1: CPT

## 2025-03-22 RX ADMIN — SODIUM CHLORIDE, PRESERVATIVE FREE 10 ML: 5 INJECTION INTRAVENOUS at 08:43

## 2025-03-22 RX ADMIN — ACETAMINOPHEN 650 MG: 325 TABLET ORAL at 12:01

## 2025-03-22 RX ADMIN — ASPIRIN 81 MG: 81 TABLET, CHEWABLE ORAL at 08:38

## 2025-03-22 RX ADMIN — DORZOLAMIDE HYDROCHLORIDE AND TIMOLOL MALEATE 1 DROP: 20; 5 SOLUTION/ DROPS OPHTHALMIC at 08:37

## 2025-03-22 RX ADMIN — APIXABAN 2.5 MG: 2.5 TABLET, FILM COATED ORAL at 08:38

## 2025-03-22 ASSESSMENT — PAIN SCALES - GENERAL: PAINLEVEL_OUTOF10: 6

## 2025-03-22 NOTE — PLAN OF CARE
Problem: Chronic Conditions and Co-morbidities  Goal: Patient's chronic conditions and co-morbidity symptoms are monitored and maintained or improved  3/21/2025 2237 by Anel Tapia RN  Outcome: Progressing  3/21/2025 1012 by Maricruz Martino RN  Outcome: Progressing     Problem: Discharge Planning  Goal: Discharge to home or other facility with appropriate resources  3/21/2025 2237 by Anel Tapia RN  Outcome: Progressing  3/21/2025 1012 by Maricruz Martino RN  Outcome: Progressing     Problem: ABCDS Injury Assessment  Goal: Absence of physical injury  3/21/2025 2237 by Anel Tapia RN  Outcome: Progressing  Flowsheets (Taken 3/21/2025 2205)  Absence of Physical Injury: Implement safety measures based on patient assessment  3/21/2025 1012 by Maricruz Martino RN  Outcome: Progressing     Problem: Skin/Tissue Integrity  Goal: Skin integrity remains intact  Description: 1.  Monitor for areas of redness and/or skin breakdown  2.  Assess vascular access sites hourly  3.  Every 4-6 hours minimum:  Change oxygen saturation probe site  4.  Every 4-6 hours:  If on nasal continuous positive airway pressure, respiratory therapy assess nares and determine need for appliance change or resting period  3/21/2025 2237 by Anel Tapia RN  Outcome: Progressing  3/21/2025 1012 by Maricruz Martino RN  Outcome: Progressing  Flowsheets (Taken 3/21/2025 1011)  Skin Integrity Remains Intact:   Monitor for areas of redness and/or skin breakdown   Assess vascular access sites hourly     Problem: Safety - Adult  Goal: Free from fall injury  3/21/2025 2237 by Anel Tapia RN  Outcome: Progressing  3/21/2025 1012 by Maricruz Martino RN  Outcome: Progressing     Problem: Neurosensory - Adult  Goal: Achieves stable or improved neurological status  3/21/2025 2237 by Anel Tapia RN  Outcome: Progressing  3/21/2025 1012 by Maricruz Martino RN  Outcome: Progressing  Goal: Achieves maximal functionality and self care  3/21/2025 2237 by  and notify attending LIP  2. [place] high risk fall precautions, as indicated  3. Provide frequent short contacts to provide reality reorientation, refocusing and direction  4. Decrease environmental stimuli, including noise as appropriate  5. Monitor and intervene to maintain adequate nutrition, hydration, elimination, sleep and activity  6. If unable to ensure safety without constant attention obtain sitter and review sitter guidelines with assigned personnel  7. Initiate Psychosocial CNS and Spiritual Care consult, as indicated  3/21/2025 2237 by Anel Tapia, RN  Outcome: Progressing  3/21/2025 1012 by Maricruz Martino RN  Outcome: Progressing     Problem: Pain  Goal: Verbalizes/displays adequate comfort level or baseline comfort level  3/21/2025 2237 by Anel Tapia, RN  Outcome: Progressing  3/21/2025 1012 by Maricruz Martino RN  Outcome: Progressing     Problem: Skin/Tissue Integrity - Adult  Goal: Incisions, wounds, or drain sites healing without S/S of infection  3/21/2025 2237 by Anel Tapia, RN  Outcome: Progressing  3/21/2025 1012 by Maricruz Martino, RN  Outcome: Progressing

## 2025-03-22 NOTE — PROGRESS NOTES
Department of Internal Medicine  Nephrology Progress Note      REASON FOR CONSULTATION:  Emergent dialysis.     HISTORY OF PRESENTING ILLNESS:  A 79-year-old  male with past medical history significant for hypertension; diabetes mellitus type 2; PLA2R membranous nephropathy; end-stage renal disease, on hemodialysis Tuesday and Saturday, came to ER complaining of shortness of breath and dyspnea on exertion.  The patient went to his dialysis unit on Saturday, but could not get the treatment because of clotted AV fistula.  He came to ER complaining of shortness of breath and dyspnea on exertion.  Admitted for further care and management.  Routine labs showed potassium was 6.7, bicarb of 13, , creatinine 13.7, and lactic acid of 5.6.  Case discussed with the emergency room team for emergent treatment of hyperkalemia and severe metabolic acidosis.  Arrangements being made for the patient to be admitted in ICU in need of temporary dialysis catheter and emergent dialysis treatment.    S/p thrombectomy of AV graft .   Used graft on HD, no issue .     Seen on HD .Tolerating well .  Labs reviewed .    REVIEW OF SYSTEMS:  No CP/SOB/QUINTEROS .    Family at bed side     Physical Exam:    VITALS:  BP (!) 140/82   Pulse 65   Temp 97.2 °F (36.2 °C) (Oral)   Resp 18   Ht 1.6 m (5' 3\")   Wt 50.1 kg (110 lb 7.2 oz) Comment: rechecked twice with scale  SpO2 98%   BMI 19.57 kg/m²   24HR INTAKE/OUTPUT:    Intake/Output Summary (Last 24 hours) at 3/22/2025 1324  Last data filed at 3/21/2025 2231  Gross per 24 hour   Intake 260 ml   Output --   Net 260 ml       Constitutional: Looks comfortable   Respiratory:  CTA  Gastrointestinal: No  tenderness.  Normal Bowel Sounds  Cardiovascular:  S1, S2 RRR   Edema:  Lower Extremity has no edema    DATA:    CBC:  Lab Results   Component Value Date/Time    WBC 11.0 03/22/2025 04:06 AM    RBC 3.53 03/22/2025 04:06 AM    HGB 10.7 03/22/2025 04:06 AM    HCT 32.9 03/22/2025 04:06  AM    MCV 93.3 03/22/2025 04:06 AM    MCH 30.2 03/22/2025 04:06 AM    MCHC 32.4 03/22/2025 04:06 AM    RDW 20.9 03/22/2025 04:06 AM    PLT 83 03/22/2025 04:06 AM    MPV 10.4 03/22/2025 04:06 AM     CMP:  Lab Results   Component Value Date/Time     03/22/2025 04:06 AM    K 5.2 03/22/2025 04:06 AM    K 5.2 03/22/2025 04:06 AM    CL 95 03/22/2025 04:06 AM    CO2 19 03/22/2025 04:06 AM     03/22/2025 04:06 AM    CREATININE 9.1 03/22/2025 04:06 AM    GFRAA >60 06/04/2014 08:02 AM    GFRAA >60 01/11/2013 09:43 AM    AGRATIO 1.5 03/20/2025 01:11 PM    LABGLOM 5 03/22/2025 04:06 AM    GLUCOSE 118 03/22/2025 04:06 AM    CALCIUM 8.7 03/22/2025 04:06 AM    BILITOT 1.4 03/21/2025 05:51 AM    ALKPHOS 73 03/21/2025 05:51 AM    AST 89 03/21/2025 05:51 AM     03/21/2025 05:51 AM      Hepatic Function Panel:   Lab Results   Component Value Date/Time    ALKPHOS 73 03/21/2025 05:51 AM     03/21/2025 05:51 AM    AST 89 03/21/2025 05:51 AM    BILITOT 1.4 03/21/2025 05:51 AM    BILIDIR 0.9 03/21/2025 05:51 AM    IBILI 0.5 03/21/2025 05:51 AM      Phosphorus:   Lab Results   Component Value Date/Time    PHOS 11.1 03/22/2025 04:06 AM       ASSESSMENT:  Principal Problem:    Hyperkalemia  Active Problems:    ESRD needing dialysis (HCC)  Resolved Problems:    * No resolved hospital problems. *      PLAN:  1-ESRD HD 2 /wk   Suggest to increase 3w/k .  Acc Lt AV graft . TW 57.0 kg .  Labs reviewed .   HD as ordered ,     2- Clotted AV graft   S/p Thrombectomy by VS.   Appreciate assistance .   Swelling at surgical site noted .   Will have VS assess pre dc home .     3- HTN  Controlled .    4- Anemia CKD  Hb noted.  Off epo .    5-Osteodystrophy   Labs reviewed .           Purvi Faust MD, FACP

## 2025-03-22 NOTE — PLAN OF CARE
Problem: Chronic Conditions and Co-morbidities  Goal: Patient's chronic conditions and co-morbidity symptoms are monitored and maintained or improved  Outcome: Completed     Problem: Discharge Planning  Goal: Discharge to home or other facility with appropriate resources  Outcome: Completed     Problem: ABCDS Injury Assessment  Goal: Absence of physical injury  Outcome: Completed     Problem: Skin/Tissue Integrity  Goal: Skin integrity remains intact  Description: 1.  Monitor for areas of redness and/or skin breakdown  2.  Assess vascular access sites hourly  3.  Every 4-6 hours minimum:  Change oxygen saturation probe site  4.  Every 4-6 hours:  If on nasal continuous positive airway pressure, respiratory therapy assess nares and determine need for appliance change or resting period  Outcome: Completed     Problem: Safety - Adult  Goal: Free from fall injury  Outcome: Completed     Problem: Neurosensory - Adult  Goal: Achieves stable or improved neurological status  Outcome: Completed  Goal: Achieves maximal functionality and self care  Outcome: Completed     Problem: Musculoskeletal - Adult  Goal: Return mobility to safest level of function  Outcome: Completed  Goal: Return ADL status to a safe level of function  Outcome: Completed     Problem: Gastrointestinal - Adult  Goal: Maintains adequate nutritional intake  Outcome: Completed     Problem: Genitourinary - Adult  Goal: Absence of urinary retention  Outcome: Completed  Goal: Urinary catheter remains patent  Outcome: Completed     Problem: Metabolic/Fluid and Electrolytes - Adult  Goal: Electrolytes maintained within normal limits  Outcome: Completed  Goal: Glucose maintained within prescribed range  Outcome: Completed     Problem: Respiratory - Adult  Goal: Achieves optimal ventilation and oxygenation  Outcome: Completed     Problem: Confusion  Goal: Confusion, delirium, dementia, or psychosis is improved or at baseline  Description: INTERVENTIONS:  1.  Assess for possible contributors to thought disturbance, including medications, impaired vision or hearing, underlying metabolic abnormalities, dehydration, psychiatric diagnoses, and notify attending LIP  2. [place] high risk fall precautions, as indicated  3. Provide frequent short contacts to provide reality reorientation, refocusing and direction  4. Decrease environmental stimuli, including noise as appropriate  5. Monitor and intervene to maintain adequate nutrition, hydration, elimination, sleep and activity  6. If unable to ensure safety without constant attention obtain sitter and review sitter guidelines with assigned personnel  7. Initiate Psychosocial CNS and Spiritual Care consult, as indicated  Outcome: Completed     Problem: Pain  Goal: Verbalizes/displays adequate comfort level or baseline comfort level  Outcome: Completed     Problem: Skin/Tissue Integrity - Adult  Goal: Incisions, wounds, or drain sites healing without S/S of infection  Outcome: Completed

## 2025-03-22 NOTE — PROGRESS NOTES
Patient discharged to home via private transport. Patient IV removed and AVS reviewed. Patient taken down to lobby in wheelchair and left with all belongings.

## 2025-03-22 NOTE — CARE COORDINATION
Discharge order noted. Chart reviewed. Plan is to return home. Faxed HD run sheet to [facility] - [phone]. No additional discharge needs identified at this time.    Electronically signed by Debra Christensen RN MSN on 3/22/2025 at 3:12 PM

## 2025-03-22 NOTE — DIALYSIS
Treatment time: 3 hours   Net UF:  1500 ml     Pre weight: 53.8 kg  Post weight:52.4 kg  EDW: TBD kg    Access used: L AVG    Hyperpigmented, hardened, shiny area noted to bottom of AVG site. Dr. Faust aware and vascular to access site.   Access function: well with -350 ml/min     Medications or blood products given: None      Regular outpatient schedule: [facility]      Summary of response to treatment: Patient tolerated treatment well and without any complications. Patient remained stable throughout entire treatment and upon the exiting the dialysis suite via transport.     Report given to Alexa López RN and copy of dialysis treatment record placed in chart, to be scanned into EMR.

## 2025-03-22 NOTE — PROGRESS NOTES
Vascular    S/P Graft thrombectomy.   Surgical site without erythema or drainage.  Incision intact.   No signs of infection.   Ok to discharge, f/u as outpatient with Dr Sher

## 2025-03-23 LAB — SMA IGG SER-ACNC: 13 UNITS (ref 0–19)

## 2025-03-23 NOTE — DISCHARGE SUMMARY
errors in grammar, punctuation, and spelling, as well as words and phrases that may be inappropriate. If there are any questions or concerns, please feel free to contact the dictating provider for clarification.

## 2025-03-25 LAB
A1AT SERPL-MCNC: 177 MG/DL (ref 90–200)
AFP-TM SERPL-MCNC: 2.2 UG/L
CERULOPLASMIN SERPL-MCNC: 31 MG/DL (ref 15–30)
HFE GENE MUT ANL BLD/T: NORMAL
HFE P.C282Y BLD/T QL: NEGATIVE
HFE P.H63D BLD/T QL: NEGATIVE
HFE P.S65C BLD/T QL: NEGATIVE
SPECIMEN SOURCE: NORMAL

## 2025-03-26 LAB — MITOCHONDRIA M2 AB SER IA-ACNC: 0.7 U/ML (ref 0–4)

## 2025-03-27 LAB
HFE GENE MUT ANL BLD/T: NORMAL
HFE P.C282Y BLD/T QL: NEGATIVE
HFE P.H63D BLD/T QL: NEGATIVE
HFE P.S65C BLD/T QL: NEGATIVE
SPECIMEN SOURCE: NORMAL

## (undated) DEVICE — Device

## (undated) DEVICE — APPLIER LIG CLP M L11IN TI STR RNG HNDL FOR 20 CLP DISP

## (undated) DEVICE — 3F 80 CM NOVASIL SILICONE SINGLE LUMEN EMBOLECTOMY CATHETER, EIFU: Brand: LEMAITRE EMBOLECTOMY CATHETER

## (undated) DEVICE — LOOP VES W25MM THK1MM MAXI RED SIL FLD REPELLENT 100 PER

## (undated) DEVICE — COVER LT HNDL BLU PLAS

## (undated) DEVICE — GLOVE SURG SZ 7.5 L11.73IN FNGR THK9.8MIL STRW LTX POLYMER

## (undated) DEVICE — SUTURE PERMAHAND SZ 2-0 L12X18IN NONABSORBABLE BLK SILK A185H

## (undated) DEVICE — 3M™ IOBAN™ 2 ANTIMICROBIAL INCISE DRAPE 6640EZ: Brand: IOBAN™ 2

## (undated) DEVICE — SUTURE PERMAHAND SZ 2-0 L18IN NONABSORBABLE BLK L26MM SH C012D

## (undated) DEVICE — TOWEL,OR,DSP,ST,WHITE,DLX,XR,4/PK,20PK/C: Brand: MEDLINE

## (undated) DEVICE — SUTURE VICRYL + SZ 2-0 L27IN ABSRB CLR CT-1 1/2 CIR TAPERCUT VCP259H

## (undated) DEVICE — RADIFOCUS GLIDEWIRE: Brand: GLIDEWIRE

## (undated) DEVICE — NEEDLE HYPO 25GA L1.5IN BVL ORIENTED ECLIPSE

## (undated) DEVICE — MERCY HEALTH WEST TURNOVER: Brand: MEDLINE INDUSTRIES, INC.

## (undated) DEVICE — SUTURE PERMAHAND SZ 3-0 L18IN NONABSORBABLE BLK SILK BRAID A184H

## (undated) DEVICE — GOWN,AURORA,NONREINF,RAGLAN,XXL,STERILE: Brand: MEDLINE

## (undated) DEVICE — LOOP VES W1.3MM THK0.9MM MINI YEL SIL FLD REPELLENT

## (undated) DEVICE — AGENT HEMSTAT W1XL2IN ABSRB SFT LTWT LAYERED ORC FIBRILLAR

## (undated) DEVICE — FOGARTY - HYDRAGRIP SURGICAL - CLAMP INSERTS: Brand: FOGARTY SOFTJAW

## (undated) DEVICE — CATHETER ANGIOPLSTY 130 CM 6X60 MM DRUG ELUT INPACT ADMIRAL

## (undated) DEVICE — AV FISTULA: Brand: MEDLINE INDUSTRIES, INC.

## (undated) DEVICE — APPLIER CLP L9.375IN APER 2.1MM CLS L3.8MM 20 SM TI CLP

## (undated) DEVICE — DISH PETRI ST LF

## (undated) DEVICE — LIQUIBAND RAPID ADHESIVE 36/CS 0.8ML: Brand: MEDLINE

## (undated) DEVICE — SUTURE NONABSORBABLE MONOFILAMENT 5-0 C-1 1X24 IN PROLENE 8725H

## (undated) DEVICE — SUTURE PERMAHAND SZ 4-0 L18IN NONABSORBABLE BLK SILK BRAID A183H

## (undated) DEVICE — PINNACLE INTRODUCER SHEATH: Brand: PINNACLE

## (undated) DEVICE — 3F 40 CM NOVASIL SILICONE SINGLE LUMEN EMBOLECTOMY CATHETER, EIFU: Brand: LEMAITRE EMBOLECTOMY CATHETER

## (undated) DEVICE — SUTURE MONOCRYL + SZ 4-0 L18IN ABSRB UD L19MM PS-2 3/8 CIR MCP496G

## (undated) DEVICE — SUTURE PROL 6-0 L24IN NONABSORBABLE BLU L13MM C-1 3/8 CIR M8726